# Patient Record
Sex: MALE | HISPANIC OR LATINO | Employment: FULL TIME | URBAN - METROPOLITAN AREA
[De-identification: names, ages, dates, MRNs, and addresses within clinical notes are randomized per-mention and may not be internally consistent; named-entity substitution may affect disease eponyms.]

---

## 2021-05-21 ENCOUNTER — HOSPITAL ENCOUNTER (OUTPATIENT)
Dept: LAB | Facility: MEDICAL CENTER | Age: 51
End: 2021-05-21
Attending: ANESTHESIOLOGY
Payer: COMMERCIAL

## 2021-05-21 LAB
COVID ORDER STATUS COVID19: NORMAL
SARS-COV-2 RNA RESP QL NAA+PROBE: NOTDETECTED
SPECIMEN SOURCE: NORMAL

## 2021-05-21 PROCEDURE — U0003 INFECTIOUS AGENT DETECTION BY NUCLEIC ACID (DNA OR RNA); SEVERE ACUTE RESPIRATORY SYNDROME CORONAVIRUS 2 (SARS-COV-2) (CORONAVIRUS DISEASE [COVID-19]), AMPLIFIED PROBE TECHNIQUE, MAKING USE OF HIGH THROUGHPUT TECHNOLOGIES AS DESCRIBED BY CMS-2020-01-R: HCPCS

## 2021-05-21 PROCEDURE — C9803 HOPD COVID-19 SPEC COLLECT: HCPCS

## 2021-05-21 PROCEDURE — U0005 INFEC AGEN DETEC AMPLI PROBE: HCPCS

## 2021-11-03 PROBLEM — S62.001S SNAC (SCAPHOID NON-UNION ADVANCED COLLAPSE) OF WRIST, RIGHT: Status: ACTIVE | Noted: 2021-11-03

## 2021-11-03 PROBLEM — M19.131 SNAC (SCAPHOID NON-UNION ADVANCED COLLAPSE) OF WRIST, RIGHT: Status: ACTIVE | Noted: 2021-11-03

## 2021-11-17 ENCOUNTER — HOSPITAL ENCOUNTER (OUTPATIENT)
Facility: MEDICAL CENTER | Age: 51
End: 2021-11-17
Attending: ANESTHESIOLOGY
Payer: COMMERCIAL

## 2021-11-17 LAB — COVID ORDER STATUS COVID19: NORMAL

## 2021-11-17 PROCEDURE — U0005 INFEC AGEN DETEC AMPLI PROBE: HCPCS

## 2021-11-17 PROCEDURE — U0003 INFECTIOUS AGENT DETECTION BY NUCLEIC ACID (DNA OR RNA); SEVERE ACUTE RESPIRATORY SYNDROME CORONAVIRUS 2 (SARS-COV-2) (CORONAVIRUS DISEASE [COVID-19]), AMPLIFIED PROBE TECHNIQUE, MAKING USE OF HIGH THROUGHPUT TECHNOLOGIES AS DESCRIBED BY CMS-2020-01-R: HCPCS

## 2021-11-18 LAB
SARS-COV-2 RNA RESP QL NAA+PROBE: NOTDETECTED
SPECIMEN SOURCE: NORMAL

## 2022-04-30 ENCOUNTER — OFFICE VISIT (OUTPATIENT)
Dept: URGENT CARE | Facility: CLINIC | Age: 52
End: 2022-04-30
Payer: COMMERCIAL

## 2022-04-30 VITALS
HEIGHT: 65 IN | DIASTOLIC BLOOD PRESSURE: 74 MMHG | OXYGEN SATURATION: 95 % | BODY MASS INDEX: 38.17 KG/M2 | TEMPERATURE: 96.4 F | RESPIRATION RATE: 16 BRPM | WEIGHT: 229.1 LBS | HEART RATE: 96 BPM | SYSTOLIC BLOOD PRESSURE: 118 MMHG

## 2022-04-30 DIAGNOSIS — M10.9 ACUTE GOUT OF RIGHT FOOT, UNSPECIFIED CAUSE: ICD-10-CM

## 2022-04-30 PROBLEM — R53.83 FATIGUE: Status: ACTIVE | Noted: 2022-03-04

## 2022-04-30 PROBLEM — E78.5 HYPERLIPIDEMIA: Status: ACTIVE | Noted: 2022-03-04

## 2022-04-30 PROBLEM — R07.9 CHEST PAIN: Status: ACTIVE | Noted: 2022-03-04

## 2022-04-30 PROBLEM — F10.11 HISTORY OF ALCOHOL ABUSE: Status: ACTIVE | Noted: 2022-03-04

## 2022-04-30 PROBLEM — R06.83 SNORING: Status: ACTIVE | Noted: 2022-03-04

## 2022-04-30 PROBLEM — E66.01 MORBID OBESITY (HCC): Status: ACTIVE | Noted: 2022-03-04

## 2022-04-30 PROBLEM — I10 ESSENTIAL HYPERTENSION: Status: ACTIVE | Noted: 2022-03-04

## 2022-04-30 PROCEDURE — 99213 OFFICE O/P EST LOW 20 MIN: CPT | Performed by: NURSE PRACTITIONER

## 2022-04-30 RX ORDER — BENZOCAINE/MENTHOL 6 MG-10 MG
1 LOZENGE MUCOUS MEMBRANE 2 TIMES DAILY
COMMUNITY
End: 2022-05-13 | Stop reason: SDUPTHER

## 2022-04-30 RX ORDER — ATORVASTATIN CALCIUM 20 MG/1
20 TABLET, FILM COATED ORAL NIGHTLY
COMMUNITY
End: 2023-05-30 | Stop reason: SDUPTHER

## 2022-04-30 RX ORDER — ATORVASTATIN CALCIUM 40 MG/1
40 TABLET, FILM COATED ORAL DAILY
COMMUNITY
End: 2022-04-30

## 2022-04-30 RX ORDER — LISINOPRIL 40 MG/1
40 TABLET ORAL DAILY
COMMUNITY
End: 2023-05-30 | Stop reason: SDUPTHER

## 2022-04-30 RX ORDER — METHYLPREDNISOLONE 4 MG/1
TABLET ORAL
Qty: 21 TABLET | Refills: 0 | Status: SHIPPED | OUTPATIENT
Start: 2022-04-30 | End: 2022-05-06

## 2022-04-30 ASSESSMENT — ENCOUNTER SYMPTOMS
NUMBNESS: 0
JOINT SWELLING: 1

## 2022-04-30 NOTE — PROGRESS NOTES
Subjective:     Cristian Suarez is a 51 y.o. male who presents for Gout (R Big Toe 3 days, swelling, a lot of pain, hx of gout, 4 months ago, swelling usually comes on ankle or knee, [facility] )      Pain and swelling to right foot x 3 days. Has a history of gout with eating seafood's. Felt feverish yesterday. No URI symptoms. Pain 7/10. States he no longer drinks alcohol, as he used to. Denies recent injury.     services used during duration of visit, #[id number].    Foot Swelling  This is a new problem. The current episode started in the past 7 days. The problem has been gradually worsening. Associated symptoms include joint swelling. Pertinent negatives include no coughing, numbness or sore throat. The symptoms are aggravated by walking. He has tried nothing for the symptoms.       Past Medical History:   Diagnosis Date   • Hyperlipidemia    • Hypertension        Past Surgical History:   Procedure Laterality Date   • PB EXCISE HAND/FOOT NEUROMA Right 11/23/2021    Procedure: LEFT WRIST DENERVATION;  Surgeon: Toby Catalan M.D.;  Location: [facility] [place];  Service: Orthopedics       Social History     Socioeconomic History   • Marital status: Unknown     Spouse name: Not on file   • Number of children: Not on file   • Years of education: Not on file   • Highest education level: Not on file   Occupational History   • Not on file   Tobacco Use   • Smoking status: Never Smoker   • Smokeless tobacco: Never Used   Substance and Sexual Activity   • Alcohol use: Not on file   • Drug use: Not on file   • Sexual activity: Not on file   Other Topics Concern   • Not on file   Social History Narrative    ** Merged History Encounter **          Social Determinants of Health     Financial Resource Strain: Not on file   Food Insecurity: Not on file   Transportation Needs: Not on file   Physical Activity: Not on file   Stress: Not on file   Social Connections: Not on file   Intimate  "Partner Violence: Not on file   Housing Stability: Not on file        History reviewed. No pertinent family history.     No Known Allergies    Review of Systems   HENT: Negative for sore throat.    Respiratory: Negative for cough.    Musculoskeletal: Positive for joint pain and joint swelling.   Neurological: Negative for sensory change and numbness.   All other systems reviewed and are negative.       Objective:   /74 (BP Location: Right arm, Patient Position: Sitting, BP Cuff Size: Adult)   Pulse 96   Temp (!) 35.8 °C (96.4 °F) (Temporal)   Resp 16   Ht 1.651 m (5' 5\")   Wt 104 kg (229 lb 1.6 oz)   SpO2 95%   BMI 38.12 kg/m²     Physical Exam  Vitals reviewed.   Constitutional:       General: He is not in acute distress.     Appearance: He is well-developed.   Cardiovascular:      Rate and Rhythm: Normal rate.      Pulses:           Dorsalis pedis pulses are 2+ on the right side.   Pulmonary:      Effort: Pulmonary effort is normal. No respiratory distress.   Musculoskeletal:         General: Swelling and tenderness present. No deformity or signs of injury. Normal range of motion.   Feet:      Comments: Tenderness with swelling and erythema to right great toe. No heat or indication of cellulitis.   Skin:     General: Skin is warm and dry.      Capillary Refill: Capillary refill takes less than 2 seconds.      Findings: No rash.   Neurological:      Mental Status: He is alert and oriented to person, place, and time.      GCS: GCS eye subscore is 4. GCS verbal subscore is 5. GCS motor subscore is 6.   Psychiatric:         Speech: Speech normal.         Behavior: Behavior normal.         Thought Content: Thought content normal.         Judgment: Judgment normal.         Assessment/Plan:   1. Acute gout of right foot, unspecified cause  - methylPREDNISolone (MEDROL DOSEPAK) 4 MG Tablet Therapy Pack; Follow schedule on package instructions.  Dispense: 21 Tablet; Refill: 0  - Referral to establish with " [facility]CP    -Monitor for possible diet correlation and modify diet. Limit alcohol intake.  -Oral hydration.  -RICE Therapy: Rest, Ice, Compression, Elevation  -Can also take tylenol as directed for pain.     Follow up with PCP. Follow up for persistent or worsening symptoms, fever, chills, signs of infection. Emergently for severe uncontrolled pain, neurovascular compromise (decreased sensation, motion, or circulation).    Discussed follow up with PCP for recurrent gout exacerbations to discuss additional therapies.     Differential diagnosis, natural history, supportive care, and indications for immediate follow-up discussed.

## 2022-04-30 NOTE — PATIENT INSTRUCTIONS
-Monitor for possible diet correlation and modify diet. Limit alcohol intake.  -Oral hydration.  -RICE Therapy: Rest, Ice, Compression, Elevation  -Can also take tylenol as directed for pain.     Follow up with PCP. Follow up for persistent or worsening symptoms, fever, chills, signs of infection. Emergently for severe uncontrolled pain, neurovascular compromise (decreased sensation, motion, or circulation).      Gota  Gout    La gota es felisha afección que causa la hinchazón dolorosa de las articulaciones. La gota es un tipo de inflamación de las articulaciones (artritis). Esta afección es causada por la acumulación de ácido úrico en el cuerpo. El ácido úrico es felisha sustancia química que se forma cuando el cuerpo descompone sustancias llamadas purinas. Las purinas son importantes para la fabricación de las proteínas del cuerpo.  Cuando el cuerpo tiene un exceso de ácido úrico, se pueden formar rich con punta y acumularse en el interior de las articulaciones. [place] provoca dolor e hinchazón. Las crisis de gota pueden ocurrir rápidamente y ser muy dolorosas (gota aguda). Con el tiempo, las crisis pueden afectar más articulaciones y volverse más frecuentes (gota crónica). La gota también puede provocar la acumulación de ácido úrico debajo de la piel y en los riñones.  ¿Cuáles son las causas?  Esta afección es causada por la acumulación de ácido úrico en la srikanth. [place] puede ocurrir debido a lo siguiente:  · Los riñones no eliminan la cantidad suficiente de ácido úrico de la srikanth. Esta es la causa más frecuente.  · El cuerpo produce demasiado ácido úrico. [place] puede ocurrir con algunos tipos de cáncer y tratamientos para el cáncer. También puede ocurrir si el cuerpo está descomponiendo demasiados glóbulos rojos (anemia hemolítica).  · Come demasiados alimentos ricos en purinas. Estos alimentos incluyen vísceras y algunos mariscos. Las bebidas alcohólicas, en especial la cerveza, también son ricas en purinas.  Felisha  crisis de gota puede desencadenarse debido a un traumatismo o estrés.  ¿Qué incrementa el riesgo?  Es más probable que tenga esta afección si:  · Tiene antecedentes familiares de gota.  · Es hombre de mediana edad.  · Es van que fernandes atravesado la menopausia.  · Tiene obesidad.  · Trudy alcohol con frecuencia, en especial, cerveza.  · Está deshidratado.  · Pierde peso con demasiada rapidez.  · Le nobles hecho un trasplante de un órgano.  · Se ha intoxicado con plomo.  · Usa determinados medicamentos, flaco aspirina, ciclosporina, diuréticos, levodopa y niacina.  · Tiene enfermedades renales.  · Tiene annabelle afección de la piel llamada psoriasis.  ¿Cuáles son los signos o los síntomas?  Un ataque de gota aguda sucede de repente. Normalmente ocurre solo en annabelle articulación. El lugar más común es el pulgar del pie. Las crisis a menudo comienzan por la noche. También pueden verse afectadas las articulaciones de los pies, los tobillos, las rodillas, los dedos de la mano, las muñecas o los codos. Los síntomas de esta afección pueden incluir los siguientes:  · Dolor intenso.  · Calor.  · Hinchazón.  · Entumecimiento.  · Dolor a la palpación. Se puede sentir mucho dolor al tacto en la articulación afectada.  · Piel brillosa, sushma o morada.  · Fiebre y escalofríos.  La gota crónica puede provocar síntomas con más frecuencia. Pueden verse involucradas más articulaciones. Es posible que también tenga bultos blancos o amarillos (tofos) en las trisha o los pies, o en otras zonas cercanas a las articulaciones.  ¿Cómo se diagnostica?  Esta afección se diagnostica en función de los síntomas, los antecedentes médicos y un examen físico. Pueden hacerle estudios, por ejemplo:  · Análisis de srikanth para medir los niveles de ácido úrico.  · Extracción de líquido sinovial con annabelle aguja delgada (aspiración) para buscar la presencia de rich de ácido úrico.  · Radiografías para observar la articulación dañada.  ¿Cómo se trata?  El tratamiento  para esta afección tiene dos fases: tratar un ataque erlin y prevenir ataques futuros. El tratamiento de la gota aguda puede incluir medicamentos para reducir el dolor y la hinchazón, por ejemplo:  · Antiinflamatorios no esteroideos (OLIVER).  · Corticoesteroides. Estos son medicamentos antiinflamatorios laith que se pueden rj por vía oral por boca o se pueden inyectar en annabelle articulación.  · Colchicina. Cecy medicamento shelia el dolor y la hinchazón cuando se usa inmediatamente después de annabelle crisis. Puede administrarse por la boca o por annabelle vía intravenosa.  El tratamiento preventivo puede incluir lo siguiente:  · El uso diario de dosis más bajas de OLIVER o colchicina.  · El uso de un medicamento que reduce los niveles de ácido úrico en la srikanth.  · Cambios en la dieta. Es posible que deba consultar a un nutricionista acerca de lo que debe comer y beber para evitar la gota.  Siga estas indicaciones en cervantes casa:  Susu annabelle crisis de gota    · Si se lo indican, aplique hielo sobre la nicci afectada:  ? Ponga el hielo en annabelle bolsa plástica.  ? Coloque annabelle toalla entre la piel y la bolsa.  ? Coloque el hielo susu 20 minutos, 2 a 3 veces por día.  · Levante (eleve) la articulación afectada por encima del nivel del corazón tantas veces flaco le sea posible.  · Rosy reposo todo el tiempo que pueda. Si la articulación afectada se encuentra en la pierna, quizá deba usar muletas.  · Siga las indicaciones del médico respecto de las restricciones en las comidas o las bebidas.  Cómo evitar las crisis de gota en el futuro  · Siga annabelle dieta baja en purinas flaco se lo haya indicado el nutricionista o el médico. Evite alimentos y bebidas con alto contenido de purinas, por ejemplo, hígado, riñón, anchoas, espárragos, arenque, hongos, mejillones y cerveza.  · Mantenga un peso saludable o pierda peso si tiene sobrepeso. Si quiere perder peso, hable con el médico. Es importante que no pierda peso demasiado rápido.  · Comience o  siga un programa de actividad física flaco se lo haya indicado el médico.  Comida y bebida  · Padmini suficiente líquido para mantener la orina de color amarillo pálido.  · Si omar alcohol:  ? Limite la cantidad que omar a lo siguiente:  § De 0 a 1 medida por día para las mujeres.  § De 0 a 2 medidas por día para los hombres.  ? Esté atento a la cantidad de alcohol que hay en las bebidas que nick. En los [place], annabelle medida equivale a annabelle botella de cerveza de 12 oz (355 ml), un vaso de vino de 5 oz (148 ml) o un vaso de annabelle bebida alcohólica de zion graduación de 1½ oz (44 ml).  Indicaciones generales  · [place] los medicamentos de venta sumi y los recetados solamente flaco se lo haya indicado el médico.  · No conduzca ni use maquinaria pesada mientras nick analgésicos recetados.  · Retome froy actividades normales según lo indicado por el médico. Pregúntele al médico qué actividades son seguras para usted.  · Concurra a todas las visitas de control flaco se lo haya indicado el médico. [place] es importante.  Comuníquese con un médico si tiene:  · Otra crisis de gota.  · Síntomas de annabelle crisis de gota que continúan después de 10 días de tratamiento.  · Efectos secundarios provocados por los medicamentos.  · Escalofríos o fiebre.  · Dolor urente al orinar.  · Dolor en la parte inferior de la espalda o en el vientre.  Solicite ayuda inmediatamente si:  · Siente dolor intenso o incontrolable.  · No puede orinar.  Resumen  · La gota es annabelle hinchazón dolorosa de las articulaciones causada por annabelle inflamación.  · El lugar más habitual donde se presenta el dolor es el dedo jerome del pie, zandra también pueden verse afectadas otras articulaciones del cuerpo.  · Los medicamentos y los cambios en la dieta pueden ayudar a evitar y tratar las crisis de gota.  Esta información no tiene flaco fin reemplazar el consejo del médico. Asegúrese de hacerle al médico cualquier pregunta que tenga.  Document Released: 09/27/2006 Document  Revised: 08/21/2019 Document Reviewed: 08/21/2019  Elsevier Patient Education © 2020 Elsevier Inc.

## 2022-05-02 ASSESSMENT — ENCOUNTER SYMPTOMS
SENSORY CHANGE: 0
SORE THROAT: 0
COUGH: 0

## 2022-05-06 ENCOUNTER — OFFICE VISIT (OUTPATIENT)
Dept: MEDICAL GROUP | Facility: PHYSICIAN GROUP | Age: 52
End: 2022-05-06
Payer: COMMERCIAL

## 2022-05-06 ENCOUNTER — TELEPHONE (OUTPATIENT)
Dept: SCHEDULING | Facility: IMAGING CENTER | Age: 52
End: 2022-05-06

## 2022-05-06 VITALS
BODY MASS INDEX: 37.15 KG/M2 | OXYGEN SATURATION: 97 % | SYSTOLIC BLOOD PRESSURE: 132 MMHG | HEIGHT: 65 IN | HEART RATE: 86 BPM | DIASTOLIC BLOOD PRESSURE: 78 MMHG | TEMPERATURE: 96.6 F | WEIGHT: 223 LBS | RESPIRATION RATE: 20 BRPM

## 2022-05-06 DIAGNOSIS — Z00.00 PHYSICAL EXAM, ANNUAL: ICD-10-CM

## 2022-05-06 DIAGNOSIS — F10.11 HISTORY OF ALCOHOL ABUSE: ICD-10-CM

## 2022-05-06 DIAGNOSIS — K57.92 DIVERTICULITIS: ICD-10-CM

## 2022-05-06 DIAGNOSIS — R06.83 SNORING: ICD-10-CM

## 2022-05-06 DIAGNOSIS — K21.9 GASTROESOPHAGEAL REFLUX DISEASE WITHOUT ESOPHAGITIS: ICD-10-CM

## 2022-05-06 DIAGNOSIS — R53.83 FATIGUE, UNSPECIFIED TYPE: ICD-10-CM

## 2022-05-06 DIAGNOSIS — F19.11 HISTORY OF DRUG ABUSE (HCC): ICD-10-CM

## 2022-05-06 DIAGNOSIS — R06.02 SOB (SHORTNESS OF BREATH): ICD-10-CM

## 2022-05-06 DIAGNOSIS — M10.9 ACUTE GOUT INVOLVING TOE OF RIGHT FOOT, UNSPECIFIED CAUSE: ICD-10-CM

## 2022-05-06 DIAGNOSIS — R10.12 LEFT UPPER QUADRANT PAIN: ICD-10-CM

## 2022-05-06 DIAGNOSIS — E78.2 MIXED HYPERLIPIDEMIA: ICD-10-CM

## 2022-05-06 DIAGNOSIS — I10 ESSENTIAL HYPERTENSION: ICD-10-CM

## 2022-05-06 PROCEDURE — 99214 OFFICE O/P EST MOD 30 MIN: CPT | Performed by: NURSE PRACTITIONER

## 2022-05-06 RX ORDER — PREDNISONE 20 MG/1
20 TABLET ORAL 2 TIMES DAILY
Qty: 30 TABLET | Refills: 0 | Status: SHIPPED | OUTPATIENT
Start: 2022-05-06 | End: 2022-06-01

## 2022-05-06 RX ORDER — PREDNISONE 20 MG/1
20 TABLET ORAL DAILY
Qty: 30 TABLET | Refills: 0 | Status: SHIPPED
Start: 2022-05-06 | End: 2022-05-06

## 2022-05-06 RX ORDER — OMEPRAZOLE 20 MG/1
20 CAPSULE, DELAYED RELEASE ORAL DAILY
Qty: 30 CAPSULE | Refills: 0 | Status: SHIPPED | OUTPATIENT
Start: 2022-05-06 | End: 2023-05-30 | Stop reason: SDUPTHER

## 2022-05-06 RX ORDER — NAPROXEN 500 MG/1
500 TABLET ORAL 2 TIMES DAILY WITH MEALS
Qty: 60 TABLET | Refills: 0 | Status: SHIPPED
Start: 2022-05-06 | End: 2022-07-26

## 2022-05-06 ASSESSMENT — PATIENT HEALTH QUESTIONNAIRE - PHQ9: CLINICAL INTERPRETATION OF PHQ2 SCORE: 0

## 2022-05-06 NOTE — ASSESSMENT & PLAN NOTE
History of drug abuse with cocaine and crystal meth however the patient has been sober for the last 10 years

## 2022-05-06 NOTE — ASSESSMENT & PLAN NOTE
Currently taking lisinopril 40 mg daily.  Blood pressure in the office 132/78.  Blood pressure controlled on lisinopril.  Patient denies headaches, change in vision, lightheadedness, chest pain.

## 2022-05-06 NOTE — ASSESSMENT & PLAN NOTE
Patient has a history of hyperlipidemia.  He currently takes atorvastatin 20 mg every night.  We will get new lipid panel as well as reassess his liver enzymes.  He denies any myalgias with this medication

## 2022-05-06 NOTE — PATIENT INSTRUCTIONS
Diverticulitis  Diverticulitis    La diverticulitis ocurre cuando pequeños bolsillos que se nobles formado en el intestino grueso (colon) se infectan o se inflaman. [place] produce dolor de estómago y heces líquidas (diarrea).  Estas bolsas en el colon se denominan divertículos. Se pili en las personas que tienen annabelle afección llamada diverticulitis.  Siga estas indicaciones en cervantes casa:  Medicamentos  · [place] los medicamentos de venta sumi y los recetados solamente flaco se lo haya indicado el médico. Estos incluyen los siguientes:  ? Antibióticos.  ? Analgésicos.  ? Pastillas de fibra.  ? Probióticos.  ? Laxantes.  · No conduzca ni use maquinaria pesada mientras nick analgésicos recetados.  · Si le recetaron un antibiótico, tómelo flaco se lo hayan indicado. No deje de tomarlos aunque se sienta mejor.  Instrucciones generales    · Siga la dieta flaco se lo haya indicado el médico.  · Cuando se sienta mejor, el médico puede indicarle que cambie la dieta. [place] vez necesite ingerir gran cantidad de fibra. La fibra facilita la evacuación intestinal (defecación). Entre los alimentos saludables con fibra, se incluyen los siguientes:  ? [place] rojos.  ? Frijoles.  ? Lentejas.  ? Verduras de hoja johnathon.  · Rosy ejercicios 3 o más veces por semana. Hágalos susu 30 minutos cada vez. Ejercítese lo suficiente flaco para transpirar y acelerar los latidos cardíacos.  · Concurra a todas las visitas de control flaco se lo hayan indicado. [place] es importante. Puede que tenga que someterse a un examen del intestino grueso. [place] se denomina colonoscopia.  Comuníquese con un médico si:  · El dolor no mejora.  · Le fernando mucho comer o beber.  · No defeca flaco lo hace normalmente.  Solicite ayuda de inmediato si:  · El dolor empeora.  · Los problemas no mejoran.  · Los problemas empeoran muy rápidamente.  · Tiene fiebre.  · Devuelve (vomita) más de annabelle vez.  · Sruthi heces tienen las siguientes características:  ? Contienen srikanth.  ? Son de color  rubén.  ? Son alquitranadas.  Resumen  · La diverticulitis ocurre cuando pequeños bolsillos que se nobles formado en el intestino grueso (colon) se infectan o se inflaman.  · [place] los medicamentos solamente flaco se lo haya indicado el médico.  · Siga la dieta flaco se lo haya indicado el médico.  Esta información no tiene flaco fin reemplazar el consejo del médico. Asegúrese de hacerle al médico cualquier pregunta que tenga.  Document Released: 12/06/2012 Document Revised: 06/21/2018 Document Reviewed: 06/21/2018  Elsevier Patient Education © 2020 Elsevier Inc.

## 2022-05-06 NOTE — ASSESSMENT & PLAN NOTE
Was recently seen at the urgent care for a gout flare on his right big toe.  At that time he was prescribed a steroid Dosepak.  He states that he has finished this and noticed that after the first few hours of taking this medication there was improvement to his toe and the pain however since he stopped it seems as if the gout attack has come back.  We will go ahead and start him on 20 mg twice daily prednisone and see if this helps with his flare.  Patient will return back to the office in 1 week.  Was also discussed with patient to start naproxen 500 mg twice daily and to continue this.

## 2022-05-07 NOTE — ASSESSMENT & PLAN NOTE
Patient and wife state that they believe he does have a history of diverticulitis  States occasional left upper quadrant abdominal pain denies chills, fevers, nausea, vomiting.

## 2022-05-07 NOTE — ASSESSMENT & PLAN NOTE
Patient complains of occasional left upper abdominal quadrant pain.  He states that sometimes it feels tight and tender when he pushes on it.  Him and his wife have noted that over the last 10 years his abdomen has enlarged.  He did have a history of alcohol abuse as well as drug abuse however he has stopped doing that more than 5 years ago.  He denies any blood in his stool, nausea vomiting, chills or fevers

## 2022-05-07 NOTE — ASSESSMENT & PLAN NOTE
BMI 37.11, weight 223  Per wife patient states that in the last 10 years she has noticed his belly become more full and distended

## 2022-05-07 NOTE — PROGRESS NOTES
Chief Complaint   Patient presents with   • [facility]     Gout x 1 yr, comes and goes R foot       Subjective:     Cristian Suarez is a 51 y.o. male presenting to establish care    1. Essential hypertension  New to me  Chronic and stable condition  Currently takes lisinopril 40 mg daily  Denies headaches, lightheadedness, change in vision    2. Fatigue, unspecified type  New to me  Chronic and stable condition    3. Mixed hyperlipidemia  New to me  Chronic stable condition  Patient currently takes atorvastatin 20 mg daily    4. Physical exam, annual  Patient needs new labs    5. SOB (shortness of breath)  New to me  Acute and uncomplicated condition  He is wondering if this is related to his GERD  He also notices when he bends down as he does have a larger belly  He is able to be active during the day without any adverse effects    6. Acute gout involving toe of right foot, unspecified cause  New to me  Acute uncomplicated condition  Patient was recently seen in the urgent care on 4/30/2022  He was provided with a Medrol Dosepak and told to return for worsening symptoms    7. History of alcohol abuse  New to me  Chronic and stable condition  Has been sober for 6 years    8. Snoring  New to me  Chronic and stable condition  Wife denies any episodes of apnea    9. History of drug abuse (HCC)  New to me  Chronic and stable condition  Patient has been clean for 10 years    10. Left upper quadrant pain  New to me  Undiagnosed new condition with uncertain prognosis  Patient states he has been having a little more abdominal upper quadrant tenderness  Denies any blood in his stool, blood in his urine, nausea vomiting  Patient and wife state that he may have a history of diverticulitis but they are unsure    11. Diverticulitis  New to me  Patient states that he may have a history of diverticulitis but they are unsure  Complains of left upper quadrant abdominal pain  Denies nausea vomiting, chills, fevers, blood  "in his stool    12. Gastroesophageal reflux disease without esophagitis  New to me  Chronic and stable condition  Patient requesting refills on omeprazole  Is wondering if his shortness of breath and his abdominal pain is related to his reflux    13. BMI 37.0-37.9, adult  New to me  Chronic and exacerbated condition  BMI 37.1, weight 223       ROS   See HPI      Current Outpatient Medications:   •  predniSONE (DELTASONE) 20 MG Tab, Take 1 Tablet by mouth 2 times a day., Disp: 30 Tablet, Rfl: 0  •  naproxen (NAPROSYN) 500 MG Tab, Take 1 Tablet by mouth 2 times a day with meals., Disp: 60 Tablet, Rfl: 0  •  omeprazole (PRILOSEC) 20 MG delayed-release capsule, Take 1 Capsule by mouth every day., Disp: 30 Capsule, Rfl: 0  •  lisinopril (PRINIVIL) 40 MG tablet, Take 40 mg by mouth every day., Disp: , Rfl:   •  atorvastatin (LIPITOR) 20 MG Tab, Take 20 mg by mouth every evening., Disp: , Rfl:   •  hydrocortisone 1 % Cream, Apply 1 Application topically 2 times a day., Disp: , Rfl:   •  ibuprofen (MOTRIN) 600 MG Tab, Take 600 mg by mouth every 6 hours as needed., Disp: , Rfl:     Past Medical History:   Diagnosis Date   • Hyperlipidemia    • Hypertension        Past Surgical History:   Procedure Laterality Date   • PB EXCISE HAND/FOOT NEUROMA Right 11/23/2021    Procedure: LEFT WRIST DENERVATION;  Surgeon: Toby Catalan M.D.;  Location: [facility] [place];  Service: Orthopedics       History reviewed. No pertinent family history.    Patient has no known allergies.    Allergies, past medical history, past surgical history, family history, social history reviewed and updated    Objective:     Vitals: /78   Pulse 86   Temp 35.9 °C (96.6 °F) (Temporal)   Resp 20   Ht 1.651 m (5' 5\")   Wt 101 kg (223 lb)   SpO2 97%   BMI 37.11 kg/m²   General: Alert, pleasant, NAD  EYES:   PERRL, EOMI, no icterus or pallor.  Conjunctivae and lids normal.   HENT:  Normocephalic.  External ears normal.   Heart: Regular " rate and rhythm.  S1 and S2 normal.  No murmurs appreciated.  Respiratory: Normal respiratory effort.  Clear to auscultation bilaterally.  Abdomen: Distended, soft, non-tender, no guarding/rebound. Bowel sounds present.    No hernias.  Skin: Warm, dry, no rashes.  Musculoskeletal: Gait is normal.  Moves all extremities well.    Extremities: No clubbing, cyanosis or edema noted.   Neurological: No tremors, sensation grossly intact, tone/strength normal, gait is normal, CN2-12 intact.  Psych:  Affect/mood is normal, judgement is good, memory is intact, grooming is appropriate.    Assessment/Plan:     Essential hypertension  Currently taking lisinopril 40 mg daily.  Blood pressure in the office 132/78.  Blood pressure controlled on lisinopril.  Patient denies headaches, change in vision, lightheadedness, chest pain.    Gout  Was recently seen at the urgent care for a gout flare on his right big toe.  At that time he was prescribed a steroid Dosepak.  He states that he has finished this and noticed that after the first few hours of taking this medication there was improvement to his toe and the pain however since he stopped it seems as if the gout attack has come back.  We will go ahead and start him on 20 mg twice daily prednisone and see if this helps with his flare.  Patient will return back to the office in 1 week.  Was also discussed with patient to start naproxen 500 mg twice daily and to continue this.        History of alcohol abuse  History of alcohol abuse however patient has been sober for the last 6 years     Hyperlipidemia  Patient has a history of hyperlipidemia.  He currently takes atorvastatin 20 mg every night.  We will get new lipid panel as well as reassess his liver enzymes.  He denies any myalgias with this medication     Snoring  Wife states he snores at night but denies apneic episdoes    History of drug abuse (HCC)  History of drug abuse with cocaine and crystal meth however the patient has been  sober for the last 10 years     BMI 37.0-37.9, adult  BMI 37.11, weight 223  Per wife patient states that in the last 10 years she has noticed his belly become more full and distended    Diverticulitis  Patient and wife state that they believe he does have a history of diverticulitis  States occasional left upper quadrant abdominal pain denies chills, fevers, nausea, vomiting.    Left upper quadrant pain  Patient complains of occasional left upper abdominal quadrant pain.  He states that sometimes it feels tight and tender when he pushes on it.  Him and his wife have noted that over the last 10 years his abdomen has enlarged.  He did have a history of alcohol abuse as well as drug abuse however he has stopped doing that more than 5 years ago.  He denies any blood in his stool, nausea vomiting, chills or fevers       Discussed with patient possible alternative diagnoses, patient is to take all medications as prescribed.     If symptoms persist FU w/PCP, if symptoms worsen go to emergency room.     If experiencing any side effects from prescribed medications reports to the office immediately or go to emergency room.    Reviewed indication, dosage, usage and potential adverse effects of prescribed medications.     Reviewed risks and benefits of treatment plan. Patient verbalizes understanding of all instruction and verbally agrees to plan.    Discussed plan with the patient, and she agrees to the above.      I personally reviewed prior external notes and test results pertinent to today's visit.        Return in about 1 week (around 5/13/2022).      Please note that this dictation was created using voice recognition software. I have made every reasonable attempt to correct obvious errors, but I expect that there may be errors of grammar and possibly content that I did not discover before finalizing the note.

## 2022-05-13 ENCOUNTER — OFFICE VISIT (OUTPATIENT)
Dept: MEDICAL GROUP | Facility: PHYSICIAN GROUP | Age: 52
End: 2022-05-13
Payer: COMMERCIAL

## 2022-05-13 VITALS
HEIGHT: 65 IN | BODY MASS INDEX: 38.05 KG/M2 | SYSTOLIC BLOOD PRESSURE: 156 MMHG | DIASTOLIC BLOOD PRESSURE: 98 MMHG | RESPIRATION RATE: 16 BRPM | WEIGHT: 228.4 LBS | TEMPERATURE: 97.4 F | HEART RATE: 86 BPM | OXYGEN SATURATION: 98 %

## 2022-05-13 DIAGNOSIS — L20.9 ATOPIC DERMATITIS, UNSPECIFIED TYPE: ICD-10-CM

## 2022-05-13 DIAGNOSIS — R10.12 LEFT UPPER QUADRANT PAIN: ICD-10-CM

## 2022-05-13 DIAGNOSIS — M10.9 ACUTE GOUT INVOLVING TOE OF RIGHT FOOT, UNSPECIFIED CAUSE: ICD-10-CM

## 2022-05-13 DIAGNOSIS — I10 ESSENTIAL HYPERTENSION: ICD-10-CM

## 2022-05-13 DIAGNOSIS — R05.8 DRY COUGH: ICD-10-CM

## 2022-05-13 PROCEDURE — 99214 OFFICE O/P EST MOD 30 MIN: CPT | Performed by: NURSE PRACTITIONER

## 2022-05-13 RX ORDER — COLCHICINE 0.6 MG/1
TABLET ORAL
Qty: 30 TABLET | Refills: 0 | Status: SHIPPED | OUTPATIENT
Start: 2022-05-13 | End: 2022-06-01 | Stop reason: SDUPTHER

## 2022-05-13 RX ORDER — BENZOCAINE/MENTHOL 6 MG-10 MG
1 LOZENGE MUCOUS MEMBRANE 2 TIMES DAILY
Qty: 20 G | Refills: 1 | Status: SHIPPED | OUTPATIENT
Start: 2022-05-13 | End: 2023-09-06 | Stop reason: SDUPTHER

## 2022-05-13 NOTE — ASSESSMENT & PLAN NOTE
/98  Taking 40 mg lisinopril daily   Has a dry cough, eye irritation sometimes a headache  Denies CP, SOB  Provided patient with blood pressure journal, instructions on how to use blood pressure journal, instructions and education on blood pressure monitoring system

## 2022-05-13 NOTE — PATIENT INSTRUCTIONS
1.  stop taking the prednisone    2.  Start taking blood pressure journal 2 times a day    3.   colchicine at the pharmacy begin taking this at the first sign of gout flare    4.   over-the-counter allergy medication such as Zyrtec, Allegra, Claritin take this daily for 2 weeks until you see me    5. Get blood work done before you see me next      Colchicine:   For gout  First sign of a flare you need to take 1.2 mg followed by 0.6 mg after 1 hour initiate as soon as possible ideally within the first 12 to 24 hours of the flare.  On day 2 and any day afterwards you will take 0.6 mg once or twice daily up to 2 days after the flare resolves    Checking Blood Pressure:  -Blood pressure cuff, spend in the $40-65, with good return policy  -It should be automatic, upper arm, measure your arm to get the correct size, probably adult Large but your arm should be under 16.5 cm. If you need an XL cuff, you will have to have it special ordered from a pharmacy or durable medical equipment company.  -Put the cuff in place, rest arm on table near height of your heart, sit quietly for 5 min, legs uncrossed, with back support, then take your blood pressure, write it down, keep a log  -Check no more than 1 time day, maybe 4-5 times per week, try different times of day.  -Can bring your cuff to at least one appointment where it can be calibrated to a manual cuff if you are concerned.  -Goal blood pressure is at least under 140/90       Salt=sodium=sea salt, guidelines say stay under 2,500 mg daily     Get salt smart, start looking at labels, count it up.  Salt is hidden in everything, salad dressing, sauces, cheese, most canned food, any processed meat

## 2022-05-14 NOTE — PROGRESS NOTES
CC:  Chief Complaint   Patient presents with   • Follow-Up   • Medication Refill     hydrocortisone 1 % Cream        • Cough     Dry cough       HISTORY OF PRESENT ILLNESS: Patient is a 51 y.o. male established patient presenting follow-up foot pain, medication refill, cough    1. Acute gout involving toe of right foot, unspecified cause  Acute and uncomplicated condition  Resolved  Patient will stop taking prednisone twice daily  Discussed diet change, seafood, red meats    2. Essential hypertension  Chronic exacerbated condition  Currently taking lisinopril 40 mg daily  Blood pressure in office 150/98, 156/98  States occasional headache  Denies lightheadedness, change in vision, chest pain, shortness of breath    3. Atopic dermatitis, unspecified type  New to me  Chronic and stable condition  Patient has a history of allergies to polyester  Is requesting refill on hydrocortisone cream in case he gets an outbreak  No current skin condition at this time    4. Dry cough  Acute uncomplicated condition  States recent cough in the last week  Patient does take lisinopril 40 mg for blood pressure  Denies sputum production, chest pain, shortness of breath    5. BMI 38.0-38.9,adult  Chronic and exacerbated condition  5 pound weight gain since last visit  BMI 38.01  Weight 228    6. Left upper quadrant pain  Chronic and stable condition  Continue to be pending labs as he has not completed these yet  Denies any change in bowels, blood in stool, nausea vomiting         Allergies:Patient has no known allergies.    Current Outpatient Medications   Medication Sig Dispense Refill   • colchicine (COLCRYS) 0.6 MG Tab Day 1: First sign of a flare you need to take 1.2 mg followed by 0.6 mg after 1 hour initiate as soon as possible ideally within the first 12 to 24 hours of the flare.  Day 2 and any day afterwards you will take 0.6 mg once or twice daily up to 2 days after the flare resolves 30 Tablet 0   • hydrocortisone 1 % Cream  "Apply 1 Application topically 2 times a day. Apply to underarms during atopic dermatitis reaction 20 g 1   • predniSONE (DELTASONE) 20 MG Tab Take 1 Tablet by mouth 2 times a day. 30 Tablet 0   • naproxen (NAPROSYN) 500 MG Tab Take 1 Tablet by mouth 2 times a day with meals. 60 Tablet 0   • omeprazole (PRILOSEC) 20 MG delayed-release capsule Take 1 Capsule by mouth every day. 30 Capsule 0   • lisinopril (PRINIVIL) 40 MG tablet Take 40 mg by mouth every day.     • atorvastatin (LIPITOR) 20 MG Tab Take 20 mg by mouth every evening.     • ibuprofen (MOTRIN) 600 MG Tab Take 600 mg by mouth every 6 hours as needed.       No current facility-administered medications for this visit.       Social History     Tobacco Use   • Smoking status: Never Smoker   • Smokeless tobacco: Never Used   Vaping Use   • Vaping Use: Never used   Substance Use Topics   • Alcohol use: Never   • Drug use: Never     Social History     Social History Narrative    ** Merged History Encounter **            History reviewed. No pertinent family history.     ROS   See HPI    - NOTE: All other systems reviewed and are negative, except as in HPI.      Exam:    BP (!) 156/98 (BP Location: Left arm, Patient Position: Sitting, BP Cuff Size: Adult)   Pulse 86   Temp 36.3 °C (97.4 °F) (Temporal)   Resp 16   Ht 1.651 m (5' 5\")   Wt 104 kg (228 lb 6.4 oz)   SpO2 98%  Body mass index is 38.01 kg/m².    General: Alert, pleasant, NAD  EYES:   PERRL, EOMI, no icterus or pallor.  Conjunctivae and lids normal.   HENT:  Normocephalic.  External ears normal.   Heart: Regular rate and rhythm.  S1 and S2 normal.  No murmurs appreciated.  Respiratory: Normal respiratory effort.  Clear to auscultation bilaterally.  Abdomen: Distended, soft, non-tender, no guarding/rebound. Bowel sounds present.  No hepatosplenomegaly.  No hernias.  Skin: Warm, dry, no rashes.  Musculoskeletal: Gait is normal.  Moves all extremities well.    Extremities: No clubbing, cyanosis or " edema noted.   Neurological: No tremors, sensation grossly intact, tone/strength normal, gait is normal, CN2-12 intact.  Psych:  Affect/mood is normal, judgement is good, memory is intact, grooming is appropriate.    Assessment/Plan:  Essential hypertension  /98  Taking 40 mg lisinopril daily   Has a dry cough, eye irritation sometimes a headache  Denies CP, SOB  Provided patient with blood pressure journal, instructions on how to use blood pressure journal, instructions and education on blood pressure monitoring system    Gout  Patient was taking prednisone 20 mg twice daily.  He states that he did notice much improvement and is no longer having swelling or pain to his right great toe.    He will stop the prednisone as he no longer has pain   We discussed starting him on medication that he be taken daily for gout prophylaxis however he would like to try diet change first   Will prescribe Colchicine 0.6mg with instructions if this occurs again we will start pt on daily meds  Will order uric acid as well    Dry cough  Onset 1 week ago   Currently taking ACE I for last 3-4 years   Not sure if it is allergies or lisinopril   We will try a OTC allergy medication for 2 week and if there is no change in cough we will change his BP meds    BMI 38.0-38.9,adult  BMI 38.01  Wt 223  Noted 5 pound weight gain since 5/6/2022  Patient states that over the last week he has improved his diet    Chest pain  States improvement with omeprazole    Left upper quadrant pain  Patient states that he did have another complaint of some left upper quadrant pain and has noticed more gassiness.  Upon palpation no guarding or rebounding patient denies pain.  We are still pending labs     1. Acute gout involving toe of right foot, unspecified cause  URIC ACID    colchicine (COLCRYS) 0.6 MG Tab   2. Essential hypertension     3. Atopic dermatitis, unspecified type  hydrocortisone 1 % Cream   4. Dry cough     5. BMI 38.0-38.9,adult     6.  Left upper quadrant pain       Discussed with patient possible alternative diagnoses, patient is to take all medications as prescribed.     If symptoms persist FU w/PCP, if symptoms worsen go to emergency room.     If experiencing any side effects from prescribed medications reports to the office immediately or go to emergency room.    Reviewed indication, dosage, usage and potential adverse effects of prescribed medications.     Reviewed risks and benefits of treatment plan. Patient verbalizes understanding of all instruction and verbally agrees to plan.      Return in about 2 weeks (around 5/27/2022) for follow up bp, cough, labs.     Please note that this dictation was created using voice recognition software. I have made every reasonable attempt to correct obvious errors, but I expect that there are errors of grammar and possibly content that I did not discover before finalizing the note.

## 2022-05-14 NOTE — ASSESSMENT & PLAN NOTE
BMI 38.01  Wt 223  Noted 5 pound weight gain since 5/6/2022  Patient states that over the last week he has improved his diet

## 2022-05-14 NOTE — ASSESSMENT & PLAN NOTE
Patient was taking prednisone 20 mg twice daily.  He states that he did notice much improvement and is no longer having swelling or pain to his right great toe.    He will stop the prednisone as he no longer has pain   We discussed starting him on medication that he be taken daily for gout prophylaxis however he would like to try diet change first   Will prescribe Colchicine 0.6mg with instructions if this occurs again we will start pt on daily meds  Will order uric acid as well

## 2022-05-14 NOTE — ASSESSMENT & PLAN NOTE
Patient states that he did have another complaint of some left upper quadrant pain and has noticed more gassiness.  Upon palpation no guarding or rebounding patient denies pain.  We are still pending labs

## 2022-05-14 NOTE — ASSESSMENT & PLAN NOTE
Onset 1 week ago   Currently taking ACE I for last 3-4 years   Not sure if it is allergies or lisinopril   We will try a OTC allergy medication for 2 week and if there is no change in cough we will change his BP meds

## 2022-06-01 ENCOUNTER — OFFICE VISIT (OUTPATIENT)
Dept: MEDICAL GROUP | Facility: PHYSICIAN GROUP | Age: 52
End: 2022-06-01
Payer: COMMERCIAL

## 2022-06-01 VITALS
TEMPERATURE: 97.9 F | BODY MASS INDEX: 37.12 KG/M2 | OXYGEN SATURATION: 99 % | RESPIRATION RATE: 20 BRPM | HEIGHT: 65 IN | DIASTOLIC BLOOD PRESSURE: 90 MMHG | WEIGHT: 222.8 LBS | HEART RATE: 89 BPM | SYSTOLIC BLOOD PRESSURE: 140 MMHG

## 2022-06-01 DIAGNOSIS — I10 ESSENTIAL HYPERTENSION: ICD-10-CM

## 2022-06-01 DIAGNOSIS — M10.9 ACUTE GOUT INVOLVING TOE OF RIGHT FOOT, UNSPECIFIED CAUSE: ICD-10-CM

## 2022-06-01 DIAGNOSIS — R05.8 DRY COUGH: ICD-10-CM

## 2022-06-01 PROBLEM — R07.9 CHEST PAIN: Status: RESOLVED | Noted: 2022-03-04 | Resolved: 2022-06-01

## 2022-06-01 PROCEDURE — 99214 OFFICE O/P EST MOD 30 MIN: CPT | Performed by: NURSE PRACTITIONER

## 2022-06-01 RX ORDER — COLCHICINE 0.6 MG/1
TABLET ORAL
Qty: 60 TABLET | Refills: 0 | Status: SHIPPED | OUTPATIENT
Start: 2022-06-01 | End: 2023-05-30 | Stop reason: SDUPTHER

## 2022-06-01 NOTE — ASSESSMENT & PLAN NOTE
Blood pressure 140/90.  Continues to take lisinopril 40 mg daily  Denies headaches, lightheadedness, shortness of breath, chest pain

## 2022-06-01 NOTE — ASSESSMENT & PLAN NOTE
Patient noted improvement to his right great toe however over the weekend while he was working and traveling he began to notice right ankle pain and swelling.  He then started taking his colchicine as prescribed and noted within 2 days that the pain had improved.  He does note that he gets occasional pain while he is ambulating but has improved.   His uric acid was 10.4 on labs for 5/20/2022   he denies any trauma  We will start him on colchicine daily even after he has completed this gout flare and have them do a uric acid test again in 1 month

## 2022-06-01 NOTE — PROGRESS NOTES
CC:  Chief Complaint   Patient presents with   • Follow-Up     Ankle, Right, swollen   • Lab Results       HISTORY OF PRESENT ILLNESS: Patient is a 51 y.o. male established patient presenting for right ankle pain, As well as follow-up lab results    1. Acute gout involving toe of right foot, unspecified cause  Acute uncomplicated condition  Patient did note pain improvement in her right great toe however states that he was working this weekend and noticed on Saturday that he started to have pain in that ankle.  He denies any trauma to this ankle he started taking his colchicine as directed and noticed improvement.    2. Essential hypertension  Chronic and stable condition  Blood pressure in office 140/90  Patient takes lisinopril 40 mg daily  Denies chest pain, lightheadedness, shortness of breath, change with vision    3. Dry cough  Improved         Allergies:Patient has no known allergies.    Current Outpatient Medications   Medication Sig Dispense Refill   • colchicine (COLCRYS) 0.6 MG Tab 0.6 mg daily. For gout flare Day 1: First sign of a flare you need to take 1.2 mg followed by 0.6 mg after 1 hour initiate as soon as possible ideally within the first 12 to 24 hours of the flare.  Day 2 and any day afterwards you will take 0.6 mg once 60 Tablet 0   • hydrocortisone 1 % Cream Apply 1 Application topically 2 times a day. Apply to underarms during atopic dermatitis reaction 20 g 1   • naproxen (NAPROSYN) 500 MG Tab Take 1 Tablet by mouth 2 times a day with meals. 60 Tablet 0   • omeprazole (PRILOSEC) 20 MG delayed-release capsule Take 1 Capsule by mouth every day. 30 Capsule 0   • lisinopril (PRINIVIL) 40 MG tablet Take 40 mg by mouth every day.     • atorvastatin (LIPITOR) 20 MG Tab Take 20 mg by mouth every evening.     • ibuprofen (MOTRIN) 600 MG Tab Take 600 mg by mouth every 6 hours as needed.       No current facility-administered medications for this visit.       Social History     Tobacco Use   • Smoking  "status: Never Smoker   • Smokeless tobacco: Never Used   Vaping Use   • Vaping Use: Never used   Substance Use Topics   • Alcohol use: Never   • Drug use: Never     Social History     Social History Narrative    ** Merged History Encounter **            History reviewed. No pertinent family history.     ROS   See HPI      - NOTE: All other systems reviewed and are negative, except as in HPI.      Exam:    BP (!) 140/90 (BP Location: Right arm, Patient Position: Sitting, BP Cuff Size: Adult)   Pulse 89   Temp 36.6 °C (97.9 °F) (Temporal)   Resp 20   Ht 1.651 m (5' 5\")   Wt 101 kg (222 lb 12.8 oz)   SpO2 99%  Body mass index is 37.08 kg/m².    General: Alert, pleasant, NAD  EYES:   PERRL, EOMI, no icterus or pallor.  Conjunctivae and lids normal.   HENT:  Normocephalic.  External ears normal.   Respiratory: Normal respiratory effort.    Skin: Warm, dry, no rashes.  Musculoskeletal: Gait is normal.  Moves all extremities well.    Extremities: No clubbing, cyanosis or edema noted.   Psych:  Affect/mood is normal, judgement is good, memory is intact, grooming is appropriate.      LABS:5/20/2022: Results reviewed and discussed with the patient, questions answered.    Assessment/Plan:  Essential hypertension  Blood pressure 140/90.  Continues to take lisinopril 40 mg daily  Denies headaches, lightheadedness, shortness of breath, chest pain    Dry cough  Improved    Gout  Patient noted improvement to his right great toe however over the weekend while he was working and traveling he began to notice right ankle pain and swelling.  He then started taking his colchicine as prescribed and noted within 2 days that the pain had improved.  He does note that he gets occasional pain while he is ambulating but has improved.   His uric acid was 10.4 on labs for 5/20/2022   he denies any trauma  We will start him on colchicine daily even after he has completed this gout flare and have them do a uric acid test again in 1 month   "   For some unknown reason patient was unable to get other labs besides his uric acid completed.  New lab orders printed for patient and he will get these completed on Friday.     Discussed with patient possible alternative diagnoses, patient is to take all medications as prescribed.     If symptoms persist FU w/PCP, if symptoms worsen go to emergency room.     If experiencing any side effects from prescribed medications reports to the office immediately or go to emergency room.    Reviewed indication, dosage, usage and potential adverse effects of prescribed medications.     Reviewed risks and benefits of treatment plan. Patient verbalizes understanding of all instruction and verbally agrees to plan.      Return in about 5 weeks (around 7/6/2022) for follow up on uric acid levels and gout.     Please note that this dictation was created using voice recognition software. I have made every reasonable attempt to correct obvious errors, but I expect that there are errors of grammar and possibly content that I did not discover before finalizing the note.

## 2022-07-26 ENCOUNTER — OFFICE VISIT (OUTPATIENT)
Dept: MEDICAL GROUP | Facility: PHYSICIAN GROUP | Age: 52
End: 2022-07-26
Payer: COMMERCIAL

## 2022-07-26 VITALS
HEART RATE: 103 BPM | DIASTOLIC BLOOD PRESSURE: 88 MMHG | TEMPERATURE: 97.9 F | BODY MASS INDEX: 37.89 KG/M2 | SYSTOLIC BLOOD PRESSURE: 142 MMHG | WEIGHT: 227.4 LBS | HEIGHT: 65 IN | OXYGEN SATURATION: 97 % | RESPIRATION RATE: 16 BRPM

## 2022-07-26 DIAGNOSIS — I10 ESSENTIAL HYPERTENSION: ICD-10-CM

## 2022-07-26 DIAGNOSIS — G89.29 CHRONIC PAIN OF RIGHT ANKLE: ICD-10-CM

## 2022-07-26 DIAGNOSIS — M25.571 CHRONIC PAIN OF RIGHT ANKLE: ICD-10-CM

## 2022-07-26 DIAGNOSIS — E79.0 ELEVATED BLOOD URIC ACID LEVEL: ICD-10-CM

## 2022-07-26 DIAGNOSIS — M10.9 ACUTE GOUT INVOLVING TOE OF RIGHT FOOT, UNSPECIFIED CAUSE: ICD-10-CM

## 2022-07-26 PROCEDURE — 99214 OFFICE O/P EST MOD 30 MIN: CPT | Performed by: NURSE PRACTITIONER

## 2022-07-26 RX ORDER — MELOXICAM 7.5 MG/1
7.5 TABLET ORAL DAILY
Qty: 60 TABLET | Refills: 0 | Status: SHIPPED | OUTPATIENT
Start: 2022-07-26 | End: 2023-09-06 | Stop reason: SDUPTHER

## 2022-07-26 RX ORDER — ALLOPURINOL 100 MG/1
100 TABLET ORAL DAILY
Qty: 45 TABLET | Refills: 0 | Status: SHIPPED | OUTPATIENT
Start: 2022-07-26 | End: 2022-10-19

## 2022-07-27 ENCOUNTER — APPOINTMENT (OUTPATIENT)
Dept: RADIOLOGY | Facility: IMAGING CENTER | Age: 52
End: 2022-07-27
Attending: NURSE PRACTITIONER
Payer: COMMERCIAL

## 2022-07-27 ENCOUNTER — NON-PROVIDER VISIT (OUTPATIENT)
Dept: URGENT CARE | Facility: CLINIC | Age: 52
End: 2022-07-27
Payer: COMMERCIAL

## 2022-07-27 DIAGNOSIS — G89.29 CHRONIC PAIN OF RIGHT ANKLE: ICD-10-CM

## 2022-07-27 DIAGNOSIS — M25.571 CHRONIC PAIN OF RIGHT ANKLE: ICD-10-CM

## 2022-07-27 PROBLEM — E79.0 ELEVATED BLOOD URIC ACID LEVEL: Status: ACTIVE | Noted: 2022-07-27

## 2022-07-27 PROCEDURE — 73610 X-RAY EXAM OF ANKLE: CPT | Mod: TC,RT | Performed by: RADIOLOGY

## 2022-07-28 NOTE — PROGRESS NOTES
CC:  Chief Complaint   Patient presents with   • Ankle Pain     FV       HISTORY OF PRESENT ILLNESS: Patient is a 51 y.o. male established patient presenting follow-up ankle pain, labs.  iPad  was utilized during appointment    Hyperlipidemia  Chronic exacerbated condition.  Labs completed on 6/10/2022 showed cholesterol 273 triglycerides 198 HDL 33   Liver enzymes are stable  Patient currently on atorvastatin 20 mg      Essential hypertension  Chronic exacerbated condition.  Patient currently takes lisinopril 40 mg daily  Blood pressure in office 142/88  Denies headaches, lightheadedness, shortness of breath, chest pain    Elevated blood uric acid level  Chronic exacerbated condition  Labs completed on 5/20/2022 show a level of 10.4  Patient has not followed up and getting a uric acid level  Reprinted order for patient to get and reevaluate        Chronic pain of right ankle  New to me  Chronic exacerbated condition  Patient has been utilizing Aleve for pain  He thought this was due to his gout however feels different and is not in the same location  Patient states that he has difficulty walking at times to the point that he has had to use his wife to help him get to the bathroom or he will hop on 1 leg  Patient denies any trauma to the area, redness, swelling, heat         Allergies:Patient has no known allergies.    Current Outpatient Medications   Medication Sig Dispense Refill   • allopurinol (ZYLOPRIM) 100 MG Tab Take 1 Tablet by mouth every day. 45 Tablet 0   • meloxicam (MOBIC) 7.5 MG Tab Take 1 Tablet by mouth every day. 60 Tablet 0   • colchicine (COLCRYS) 0.6 MG Tab 0.6 mg daily. For gout flare Day 1: First sign of a flare you need to take 1.2 mg followed by 0.6 mg after 1 hour initiate as soon as possible ideally within the first 12 to 24 hours of the flare.  Day 2 and any day afterwards you will take 0.6 mg once 60 Tablet 0   • hydrocortisone 1 % Cream Apply 1 Application topically 2  "times a day. Apply to underarms during atopic dermatitis reaction 20 g 1   • omeprazole (PRILOSEC) 20 MG delayed-release capsule Take 1 Capsule by mouth every day. 30 Capsule 0   • lisinopril (PRINIVIL) 40 MG tablet Take 40 mg by mouth every day.     • atorvastatin (LIPITOR) 20 MG Tab Take 20 mg by mouth every evening.     • ibuprofen (MOTRIN) 600 MG Tab Take 600 mg by mouth every 6 hours as needed.       No current facility-administered medications for this visit.     Past Medical History:   Diagnosis Date   • Hyperlipidemia    • Hypertension      Past Surgical History:   Procedure Laterality Date   • PB EXCISE HAND/FOOT NEUROMA Right 11/23/2021    Procedure: LEFT WRIST DENERVATION;  Surgeon: Toby Catalan M.D.;  Location: [facility] [place];  Service: Orthopedics     Social History     Tobacco Use   • Smoking status: Never Smoker   • Smokeless tobacco: Never Used   Vaping Use   • Vaping Use: Never used   Substance Use Topics   • Alcohol use: Never   • Drug use: Never     Social History     Social History Narrative    ** Merged History Encounter **            History reviewed. No pertinent family history.     ROS   See HPI      - NOTE: All other systems reviewed and are negative, except as in HPI.      Exam:    BP (!) 142/88 (BP Location: Right arm, Patient Position: Sitting, BP Cuff Size: Adult)   Pulse (!) 103   Temp 36.6 °C (97.9 °F) (Temporal)   Resp 16   Ht 1.651 m (5' 5\")   Wt 103 kg (227 lb 6.4 oz)   SpO2 97%  Body mass index is 37.84 kg/m².    General: Alert, pleasant, NAD  EYES:   PERRL, EOMI, no icterus or pallor.  Conjunctivae and lids normal.   HENT:  Normocephalic.  External ears normal.   Heart: Regular rate and rhythm.  S1 and S2 normal.  No murmurs appreciated.  Respiratory: Normal respiratory effort.  Clear to auscultation bilaterally.  Abdomen: Obese, soft, non-tender, no guarding/rebound. Bowel sounds present.  No hepatosplenomegaly.  No hernias.  Skin: Warm, dry, no " rashes.  Musculoskeletal: Gait is normal.  Moves all extremities well.    Extremities: No clubbing, cyanosis or edema noted.  Right lateral ankle with no noted erythema, edema.  Neurological: No tremors, sensation grossly intact, tone/strength normal, gait is normal.  Psych:  Affect/mood is normal, judgement is good, memory is intact, grooming is appropriate.      LABS: 5/20/2020 to 6/10/2022: Results reviewed and discussed with the patient, questions answered.    Assessment/Plan:  1. Acute gout involving toe of right foot, unspecified cause  - allopurinol (ZYLOPRIM) 100 MG Tab; Take 1 Tablet by mouth every day.  Dispense: 45 Tablet; Refill: 0  Reevaluate uric acid for next visit with a follow-up in 1-2 months    2. Elevated blood uric acid level  - allopurinol (ZYLOPRIM) 100 MG Tab; Take 1 Tablet by mouth every day.  Dispense: 45 Tablet; Refill: 0    3. Chronic pain of right ankle  - DX-ANKLE 3+ VIEWS RIGHT; Future  - meloxicam (MOBIC) 7.5 MG Tab; Take 1 Tablet by mouth every day.  Dispense: 60 Tablet; Refill: 0    4. Essential hypertension  At her next visit we will reevaluate blood pressure and have patient follow-up with blood pressure journal patient may need to get started on a secondary medication for blood pressure however we will be mindful of his gout and any type of diuretics       Discussed with patient possible alternative diagnoses, patient is to take all medications as prescribed.     If symptoms persist FU w/PCP, if symptoms worsen go to emergency room.     If experiencing any side effects from prescribed medications reports to the office immediately or go to emergency room.    Reviewed indication, dosage, usage and potential adverse effects of prescribed medications.     Reviewed risks and benefits of treatment plan. Patient verbalizes understanding of all instruction and verbally agrees to plan.    Return in about 3 weeks (around 8/16/2022) for follow up labs .    Please note that this dictation  was created using voice recognition software. I have made every reasonable attempt to correct obvious errors, but I expect that there are errors of grammar and possibly content that I did not discover before finalizing the note.

## 2022-07-28 NOTE — ASSESSMENT & PLAN NOTE
Chronic exacerbated condition  Labs completed on 5/20/2022 show a level of 10.4  Patient has not followed up and getting a uric acid level  Reprinted order for patient to get and reevaluate

## 2022-07-28 NOTE — ASSESSMENT & PLAN NOTE
New to me  Chronic exacerbated condition  Patient has been utilizing Aleve for pain  He thought this was due to his gout however feels different and is not in the same location  Patient states that he has difficulty walking at times to the point that he has had to use his wife to help him get to the bathroom or he will hop on 1 leg  Patient denies any trauma to the area, redness, swelling, heat

## 2022-07-28 NOTE — ASSESSMENT & PLAN NOTE
Chronic exacerbated condition.  Labs completed on 6/10/2022 showed cholesterol 273 triglycerides 198 HDL 33   Liver enzymes are stable  Patient currently on atorvastatin 20 mg

## 2022-07-28 NOTE — ASSESSMENT & PLAN NOTE
Chronic exacerbated condition.  Patient currently takes lisinopril 40 mg daily  Blood pressure in office 142/88  Denies headaches, lightheadedness, shortness of breath, chest pain

## 2022-10-18 DIAGNOSIS — E79.0 ELEVATED BLOOD URIC ACID LEVEL: ICD-10-CM

## 2022-10-18 DIAGNOSIS — M10.9 ACUTE GOUT INVOLVING TOE OF RIGHT FOOT, UNSPECIFIED CAUSE: ICD-10-CM

## 2022-10-19 RX ORDER — ALLOPURINOL 100 MG/1
TABLET ORAL
Qty: 90 TABLET | Refills: 0 | Status: SHIPPED | OUTPATIENT
Start: 2022-10-19 | End: 2023-05-09

## 2022-11-08 ENCOUNTER — NON-PROVIDER VISIT (OUTPATIENT)
Dept: MEDICAL GROUP | Facility: PHYSICIAN GROUP | Age: 52
End: 2022-11-08
Payer: COMMERCIAL

## 2022-11-08 DIAGNOSIS — Z23 NEED FOR VACCINATION: ICD-10-CM

## 2022-11-08 PROCEDURE — 90686 IIV4 VACC NO PRSV 0.5 ML IM: CPT | Performed by: NURSE PRACTITIONER

## 2022-11-08 PROCEDURE — 90471 IMMUNIZATION ADMIN: CPT | Performed by: NURSE PRACTITIONER

## 2022-11-08 NOTE — NON-PROVIDER
"Cristian Suarez is a 52 y.o. male here for a non-provider visit for:   FLU    Reason for immunization: Annual Flu Vaccine  Immunization records indicate need for vaccine: Yes, confirmed with Epic  Minimum interval has been met for this vaccine: Yes  ABN completed: Yes    VIS Dated  8/6/2021 was given to patient: Yes  All IAC Questionnaire questions were answered \"No.\"    Patient tolerated injection and no adverse effects were observed or reported: Yes    Pt scheduled for next dose in series: No   "

## 2023-05-08 DIAGNOSIS — E79.0 ELEVATED BLOOD URIC ACID LEVEL: ICD-10-CM

## 2023-05-08 DIAGNOSIS — M10.9 ACUTE GOUT INVOLVING TOE OF RIGHT FOOT, UNSPECIFIED CAUSE: ICD-10-CM

## 2023-05-09 RX ORDER — ALLOPURINOL 100 MG/1
TABLET ORAL
Qty: 30 TABLET | Refills: 0 | Status: SHIPPED | OUTPATIENT
Start: 2023-05-09 | End: 2023-05-30 | Stop reason: SDUPTHER

## 2023-05-30 ENCOUNTER — OFFICE VISIT (OUTPATIENT)
Dept: MEDICAL GROUP | Facility: PHYSICIAN GROUP | Age: 53
End: 2023-05-30
Payer: COMMERCIAL

## 2023-05-30 VITALS
HEIGHT: 65 IN | SYSTOLIC BLOOD PRESSURE: 160 MMHG | DIASTOLIC BLOOD PRESSURE: 108 MMHG | WEIGHT: 223.6 LBS | HEART RATE: 92 BPM | BODY MASS INDEX: 37.25 KG/M2 | OXYGEN SATURATION: 96 % | TEMPERATURE: 98 F | RESPIRATION RATE: 14 BRPM

## 2023-05-30 DIAGNOSIS — R07.9 CHEST PAIN, UNSPECIFIED TYPE: ICD-10-CM

## 2023-05-30 DIAGNOSIS — G89.29 CHRONIC PAIN OF RIGHT ANKLE: ICD-10-CM

## 2023-05-30 DIAGNOSIS — I10 ESSENTIAL HYPERTENSION: ICD-10-CM

## 2023-05-30 DIAGNOSIS — E79.0 ELEVATED BLOOD URIC ACID LEVEL: ICD-10-CM

## 2023-05-30 DIAGNOSIS — Z00.00 WELL ADULT EXAM: ICD-10-CM

## 2023-05-30 DIAGNOSIS — E78.2 MIXED HYPERLIPIDEMIA: ICD-10-CM

## 2023-05-30 DIAGNOSIS — M25.571 CHRONIC PAIN OF RIGHT ANKLE: ICD-10-CM

## 2023-05-30 DIAGNOSIS — K21.9 GASTROESOPHAGEAL REFLUX DISEASE WITHOUT ESOPHAGITIS: ICD-10-CM

## 2023-05-30 DIAGNOSIS — M10.9 ACUTE GOUT INVOLVING TOE OF RIGHT FOOT, UNSPECIFIED CAUSE: ICD-10-CM

## 2023-05-30 PROBLEM — R05.8 DRY COUGH: Status: RESOLVED | Noted: 2022-05-13 | Resolved: 2023-05-30

## 2023-05-30 PROCEDURE — 99214 OFFICE O/P EST MOD 30 MIN: CPT | Performed by: NURSE PRACTITIONER

## 2023-05-30 PROCEDURE — 3080F DIAST BP >= 90 MM HG: CPT | Performed by: NURSE PRACTITIONER

## 2023-05-30 PROCEDURE — 3077F SYST BP >= 140 MM HG: CPT | Performed by: NURSE PRACTITIONER

## 2023-05-30 RX ORDER — IBUPROFEN 600 MG/1
600 TABLET ORAL EVERY 6 HOURS PRN
Qty: 90 TABLET | Refills: 0 | Status: SHIPPED | OUTPATIENT
Start: 2023-05-30 | End: 2023-09-06 | Stop reason: SDUPTHER

## 2023-05-30 RX ORDER — LISINOPRIL 40 MG/1
10 TABLET ORAL DAILY
COMMUNITY
Start: 2023-05-07 | End: 2023-05-30

## 2023-05-30 RX ORDER — ATORVASTATIN CALCIUM 20 MG/1
20 TABLET, FILM COATED ORAL NIGHTLY
Qty: 90 TABLET | Refills: 3 | Status: SHIPPED | OUTPATIENT
Start: 2023-05-30 | End: 2023-09-06 | Stop reason: SDUPTHER

## 2023-05-30 RX ORDER — LISINOPRIL 40 MG/1
40 TABLET ORAL DAILY
Qty: 90 TABLET | Refills: 3 | Status: SHIPPED | OUTPATIENT
Start: 2023-05-30 | End: 2023-09-06 | Stop reason: SDUPTHER

## 2023-05-30 RX ORDER — ALLOPURINOL 100 MG/1
100 TABLET ORAL DAILY
Qty: 90 TABLET | Refills: 3 | Status: SHIPPED | OUTPATIENT
Start: 2023-05-30 | End: 2023-09-06 | Stop reason: SDUPTHER

## 2023-05-30 RX ORDER — METHYLPREDNISOLONE 4 MG/1
TABLET ORAL
COMMUNITY
Start: 2023-05-07 | End: 2023-05-30

## 2023-05-30 RX ORDER — COLCHICINE 0.6 MG/1
TABLET ORAL
Qty: 60 TABLET | Refills: 0 | Status: SHIPPED | OUTPATIENT
Start: 2023-05-30 | End: 2023-09-06 | Stop reason: SDUPTHER

## 2023-05-30 RX ORDER — OMEPRAZOLE 20 MG/1
20 CAPSULE, DELAYED RELEASE ORAL DAILY
Qty: 90 CAPSULE | Refills: 3 | Status: SHIPPED | OUTPATIENT
Start: 2023-05-30 | End: 2023-09-06 | Stop reason: SDUPTHER

## 2023-05-30 ASSESSMENT — ENCOUNTER SYMPTOMS
SHORTNESS OF BREATH: 0
HEARTBURN: 1
DIZZINESS: 0
CONSTIPATION: 0
DEPRESSION: 1
HEADACHES: 0
VOMITING: 0
ABDOMINAL PAIN: 0
NAUSEA: 0
PALPITATIONS: 0
FEVER: 0
CHILLS: 0
DIARRHEA: 0

## 2023-05-30 ASSESSMENT — PATIENT HEALTH QUESTIONNAIRE - PHQ9
CLINICAL INTERPRETATION OF PHQ2 SCORE: 3
5. POOR APPETITE OR OVEREATING: 0 - NOT AT ALL
SUM OF ALL RESPONSES TO PHQ QUESTIONS 1-9: 13

## 2023-05-30 NOTE — PROGRESS NOTES
"CC:  Chief Complaint   Patient presents with    Medication Refill     All    Depression       HISTORY OF PRESENT ILLNESS: Patient is a 52 y.o. male established patient presenting     Problem   Chest Pain    Patient states that recently he was seen in the hospital after having some left-sided chest pain.  He states that they felt it was possibly related to his blood pressure and told him to follow-up with his PCP.    Patient denies any current active chest pain, shortness of breath, dizziness, nausea, vomiting, diaphoresis, walking       Essential Hypertension    Patient was recently seen in the emergency room after a gout attack and noted to have elevated blood pressure there.  Blood pressure in office today 160/108.  He states that he has run out of his blood pressure medication for the last few months.       Gout    Patient was recently seen in the ER after having severe gout attack.  He states that he ran out of his allopurinol and was unable to get it refilled.  He is requesting refills today as well as new labs       Hyperlipidemia    Patient needs new labs and new refills       Dry Cough (Resolved)         Review of Systems   Constitutional:  Negative for chills and fever.   Respiratory:  Negative for shortness of breath.    Cardiovascular:  Negative for chest pain, palpitations and leg swelling.   Gastrointestinal:  Positive for heartburn. Negative for abdominal pain, constipation, diarrhea, nausea and vomiting.   Neurological:  Negative for dizziness and headaches.   Psychiatric/Behavioral:  Positive for depression.              - NOTE: All other systems reviewed and are negative, except as in HPI.      Exam:    BP (!) 160/108 (BP Location: Left arm, Patient Position: Sitting, BP Cuff Size: Adult)   Pulse 92   Temp 36.7 °C (98 °F) (Temporal)   Resp 14   Ht 1.651 m (5' 5\")   Wt 101 kg (223 lb 9.6 oz)   SpO2 96%  Body mass index is 37.21 kg/m².    Physical Exam  Constitutional:       General: He is not " in acute distress.     Appearance: Normal appearance. He is obese. He is not ill-appearing.   HENT:      Head: Normocephalic and atraumatic.   Cardiovascular:      Rate and Rhythm: Normal rate and regular rhythm.      Pulses: Normal pulses.      Heart sounds: Normal heart sounds.   Pulmonary:      Effort: Pulmonary effort is normal.      Breath sounds: Normal breath sounds.   Musculoskeletal:         General: No swelling. Normal range of motion.   Skin:     General: Skin is warm and dry.      Capillary Refill: Capillary refill takes less than 2 seconds.   Neurological:      Mental Status: He is alert and oriented to person, place, and time.   Psychiatric:         Behavior: Behavior normal.           Assessment/Plan:    1. Well adult exam  - CBC WITHOUT DIFFERENTIAL; Future  - Lipid Profile; Future  - TSH; Future  - FREE THYROXINE; Future  - URIC ACID; Future  - Comp Metabolic Panel; Future    2. Gastroesophageal reflux disease without esophagitis  Chronic and stable condition.  - omeprazole (PRILOSEC) 20 MG delayed-release capsule; Take 1 Capsule by mouth every day.  Dispense: 90 Capsule; Refill: 3    3. Acute gout involving toe of right foot, unspecified cause  Chronic and stable condition  - ibuprofen (MOTRIN) 600 MG Tab; Take 1 Tablet by mouth every 6 hours as needed for Moderate Pain.  Dispense: 90 Tablet; Refill: 0  - colchicine (COLCRYS) 0.6 MG Tab; 0.6 mg daily. For gout flare Day 1: First sign of a flare you need to take 1.2 mg followed by 0.6 mg after 1 hour initiate as soon as possible ideally within the first 12 to 24 hours of the flare.  Day 2 and any day afterwards you will take 0.6 mg once  Dispense: 60 Tablet; Refill: 0  - allopurinol (ZYLOPRIM) 100 MG Tab; Take 1 Tablet by mouth every day.  Dispense: 90 Tablet; Refill: 3    4. Elevated blood uric acid level  Chronic stable condition  - allopurinol (ZYLOPRIM) 100 MG Tab; Take 1 Tablet by mouth every day.  Dispense: 90 Tablet; Refill: 3    5. Essential  hypertension  Chronic exacerbated condition  - lisinopril (PRINIVIL) 40 MG tablet; Take 1 Tablet by mouth every day.  Dispense: 90 Tablet; Refill: 3    6. Chronic pain of right ankle  Chronic and stable condition  - ibuprofen (MOTRIN) 600 MG Tab; Take 1 Tablet by mouth every 6 hours as needed for Moderate Pain.  Dispense: 90 Tablet; Refill: 0    7. Mixed hyperlipidemia  Chronic and stable condition  - atorvastatin (LIPITOR) 20 MG Tab; Take 1 Tablet by mouth every evening.  Dispense: 90 Tablet; Refill: 3    8. Chest pain, unspecified type  Acute uncomplicated condition  Has noted this experience a few times.  He denies any at this time.  We will get stress test to rule out any cardiac involvement  - NM-CARDIAC STRESS TEST; Future       Discussed with patient possible alternative diagnoses, patient is to take all medications as prescribed.     If symptoms persist FU w/PCP, if symptoms worsen go to emergency room.     If experiencing any side effects from prescribed medications reports to the office immediately or go to emergency room.    Reviewed indication, dosage, usage and potential adverse effects of prescribed medications.     Reviewed risks and benefits of treatment plan. Patient verbalizes understanding of all instruction and verbally agrees to plan.      Return in about 4 weeks (around 6/27/2023) for Follow-up in 3 to 4 weeks for blood pressure management.      Please note that this dictation was created using voice recognition software. I have made every reasonable attempt to correct obvious errors, but I expect that there are errors of grammar and possibly content that I did not discover before finalizing the note.

## 2023-06-28 ENCOUNTER — TELEPHONE (OUTPATIENT)
Dept: HEALTH INFORMATION MANAGEMENT | Facility: OTHER | Age: 53
End: 2023-06-28

## 2023-09-06 DIAGNOSIS — E79.0 ELEVATED BLOOD URIC ACID LEVEL: ICD-10-CM

## 2023-09-06 DIAGNOSIS — L20.9 ATOPIC DERMATITIS, UNSPECIFIED TYPE: ICD-10-CM

## 2023-09-06 DIAGNOSIS — M10.9 ACUTE GOUT INVOLVING TOE OF RIGHT FOOT, UNSPECIFIED CAUSE: ICD-10-CM

## 2023-09-06 DIAGNOSIS — M25.571 CHRONIC PAIN OF RIGHT ANKLE: ICD-10-CM

## 2023-09-06 DIAGNOSIS — K21.9 GASTROESOPHAGEAL REFLUX DISEASE WITHOUT ESOPHAGITIS: ICD-10-CM

## 2023-09-06 DIAGNOSIS — I10 ESSENTIAL HYPERTENSION: ICD-10-CM

## 2023-09-06 DIAGNOSIS — G89.29 CHRONIC PAIN OF RIGHT ANKLE: ICD-10-CM

## 2023-09-06 DIAGNOSIS — E78.2 MIXED HYPERLIPIDEMIA: ICD-10-CM

## 2023-09-07 RX ORDER — BENZOCAINE/MENTHOL 6 MG-10 MG
1 LOZENGE MUCOUS MEMBRANE 2 TIMES DAILY
Qty: 20 G | Refills: 1 | Status: SHIPPED | OUTPATIENT
Start: 2023-09-07

## 2023-09-07 RX ORDER — MELOXICAM 7.5 MG/1
7.5 TABLET ORAL DAILY
Qty: 60 TABLET | Refills: 0 | Status: SHIPPED | OUTPATIENT
Start: 2023-09-07

## 2023-09-07 RX ORDER — ATORVASTATIN CALCIUM 20 MG/1
20 TABLET, FILM COATED ORAL NIGHTLY
Qty: 90 TABLET | Refills: 3 | Status: SHIPPED | OUTPATIENT
Start: 2023-09-07

## 2023-09-07 RX ORDER — COLCHICINE 0.6 MG/1
TABLET ORAL
Qty: 60 TABLET | Refills: 0 | Status: SHIPPED | OUTPATIENT
Start: 2023-09-07

## 2023-09-07 RX ORDER — OMEPRAZOLE 20 MG/1
20 CAPSULE, DELAYED RELEASE ORAL DAILY
Qty: 90 CAPSULE | Refills: 3 | Status: SHIPPED | OUTPATIENT
Start: 2023-09-07

## 2023-09-07 RX ORDER — IBUPROFEN 600 MG/1
600 TABLET ORAL EVERY 6 HOURS PRN
Qty: 90 TABLET | Refills: 0 | Status: SHIPPED | OUTPATIENT
Start: 2023-09-07 | End: 2024-03-05

## 2023-09-07 RX ORDER — ALLOPURINOL 100 MG/1
100 TABLET ORAL DAILY
Qty: 90 TABLET | Refills: 3 | Status: SHIPPED | OUTPATIENT
Start: 2023-09-07

## 2023-09-07 RX ORDER — LISINOPRIL 40 MG/1
40 TABLET ORAL DAILY
Qty: 90 TABLET | Refills: 3 | Status: SHIPPED | OUTPATIENT
Start: 2023-09-07

## 2024-01-17 ENCOUNTER — OFFICE VISIT (OUTPATIENT)
Dept: MEDICAL GROUP | Facility: PHYSICIAN GROUP | Age: 54
End: 2024-01-17
Payer: COMMERCIAL

## 2024-01-17 VITALS
HEIGHT: 65 IN | WEIGHT: 237 LBS | DIASTOLIC BLOOD PRESSURE: 90 MMHG | BODY MASS INDEX: 39.49 KG/M2 | SYSTOLIC BLOOD PRESSURE: 156 MMHG | RESPIRATION RATE: 16 BRPM | HEART RATE: 92 BPM | OXYGEN SATURATION: 95 % | TEMPERATURE: 98.9 F

## 2024-01-17 DIAGNOSIS — R07.9 CHEST PAIN, UNSPECIFIED TYPE: ICD-10-CM

## 2024-01-17 DIAGNOSIS — F41.9 ANXIETY: ICD-10-CM

## 2024-01-17 DIAGNOSIS — F33.1 MODERATE EPISODE OF RECURRENT MAJOR DEPRESSIVE DISORDER (HCC): ICD-10-CM

## 2024-01-17 DIAGNOSIS — I10 ESSENTIAL HYPERTENSION: ICD-10-CM

## 2024-01-17 PROBLEM — E66.9 OBESITY, CLASS II, BMI 35-39.9: Status: ACTIVE | Noted: 2022-03-04

## 2024-01-17 PROBLEM — E66.812 OBESITY, CLASS II, BMI 35-39.9: Status: ACTIVE | Noted: 2022-03-04

## 2024-01-17 PROCEDURE — 3077F SYST BP >= 140 MM HG: CPT | Performed by: NURSE PRACTITIONER

## 2024-01-17 PROCEDURE — 3080F DIAST BP >= 90 MM HG: CPT | Performed by: NURSE PRACTITIONER

## 2024-01-17 PROCEDURE — 99215 OFFICE O/P EST HI 40 MIN: CPT | Performed by: NURSE PRACTITIONER

## 2024-01-17 PROCEDURE — 93000 ELECTROCARDIOGRAM COMPLETE: CPT | Performed by: NURSE PRACTITIONER

## 2024-01-17 ASSESSMENT — PATIENT HEALTH QUESTIONNAIRE - PHQ9
CLINICAL INTERPRETATION OF PHQ2 SCORE: 4
SUM OF ALL RESPONSES TO PHQ QUESTIONS 1-9: 15
5. POOR APPETITE OR OVEREATING: 3 - NEARLY EVERY DAY

## 2024-01-17 ASSESSMENT — ENCOUNTER SYMPTOMS
FEVER: 0
NERVOUS/ANXIOUS: 1
WEIGHT LOSS: 0
SHORTNESS OF BREATH: 0
CHILLS: 0
DEPRESSION: 1
PALPITATIONS: 0
DIZZINESS: 0
HEADACHES: 0

## 2024-01-18 NOTE — PROGRESS NOTES
CC:  Chief Complaint   Patient presents with    Annual Exam     PE        HISTORY OF PRESENT ILLNESS: Patient is a 53 y.o. male established patient presenting     Problem   Moderate Episode of Recurrent Major Depressive Disorder (Hcc)    Patient states he is not interested in anything, does not care about anything  Feels tired, no energy, does not want to go to work   Has been feeling like this for a while but thought it was related to his job loss and pain  Has been on medication for this in the past but does no remember the name   Wife states she has been noticing he has to have something to do and has been on his phone a lot  Was gambling a lot and losing a lot of money     Denies SI/HI    Depression Screening    Little interest or pleasure in doing things?  2 - more than half the days   Feeling down, depressed , or hopeless? 2 - more than half the days   Trouble falling or staying asleep, or sleeping too much?  2 - more than half the days   Feeling tired or having little energy?  3 - nearly every day   Poor appetite or overeating?  3 - nearly every day   Feeling bad about yourself - or that you are a failure or have let yourself or your family down? 3 - nearly every day   Trouble concentrating on things, such as reading the newspaper or watching television? 0 - not at all   Moving or speaking so slowly that other people could have noticed.  Or the opposite - being so fidgety or restless that you have been moving around a lot more than usual?  0 - not at all   Thoughts that you would be better off dead, or of hurting yourself?  0 - not at all   Patient Health Questionnaire Score: 15       If depressive symptoms identified deferred to follow up visit unless specifically addressed in assesment and plan.    Interpretation of PHQ-9 Total Score   Score Severity   1-4 No Depression   5-9 Mild Depression   10-14 Moderate Depression   15-19 Moderately Severe Depression   20-27 Severe Depression       Anxiety    Anxiety  "about money   Was gambling and wife took his cards and money away        Chest Pain    Patient states that he has been experiencing chest pain recently.    We will get EKG completed here in the office.  Patient never completed stress test that was ordered back in May.    Does have elevated blood pressure today 156/90 however patient does state he is anxious as well as depressed and is not following up on his medications as prescribed.       Essential Hypertension    BP in office 156/90  Notes occasional CP   Currently takes lisinopril 40 mg but has only be taking it once a week     Obesity, Class II, Bmi 35-39.9         Review of Systems   Constitutional:  Negative for chills, fever, malaise/fatigue and weight loss.   Respiratory:  Negative for shortness of breath.    Cardiovascular:  Positive for chest pain. Negative for palpitations and leg swelling.   Neurological:  Negative for dizziness and headaches.   Psychiatric/Behavioral:  Positive for depression. Negative for suicidal ideas. The patient is nervous/anxious.              - NOTE: All other systems reviewed and are negative, except as in HPI.      Exam:    BP (!) 156/90   Pulse 92   Temp 37.2 °C (98.9 °F) (Temporal)   Resp 16   Ht 1.651 m (5' 5\")   Wt 108 kg (237 lb)   SpO2 95%  Body mass index is 39.44 kg/m².    Physical Exam  Constitutional:       Appearance: Normal appearance. He is obese.   HENT:      Head: Normocephalic and atraumatic.   Cardiovascular:      Rate and Rhythm: Normal rate and regular rhythm.      Pulses: Normal pulses.      Heart sounds: Normal heart sounds. No murmur heard.     No gallop.   Pulmonary:      Effort: Pulmonary effort is normal.      Breath sounds: Normal breath sounds.   Neurological:      Mental Status: He is alert and oriented to person, place, and time.   Psychiatric:         Mood and Affect: Mood normal.         Behavior: Behavior normal.         Thought Content: Thought content normal.         Judgment: Judgment " normal.           Assessment/Plan:    1. Moderate episode of recurrent major depressive disorder (HCC)  Chronic stable condition  We will follow-up at next visit  - sertraline (ZOLOFT) 50 MG Tab; Take 1 Tablet by mouth every day.  Dispense: 30 Tablet; Refill: 11    2. Essential hypertension  Chronic exacerbated condition  - EKG - Clinic Performed  EKG Interpretation-HR is 86 normal sinus rhythm, with abnormal T waves in the lateral leads aVL, V5 and V6.  2 EKGs were completed which showed similar results.    Discussed EKG with patient and wife and they plan to drive to the emergency room immediately.  Offered ambulance however they were not interested at this time.  Discussed concerns for risk of going to the emergency room via private vehicle and they understand.    3. Anxiety  Chronic and exacerbated condition  - sertraline (ZOLOFT) 50 MG Tab; Take 1 Tablet by mouth every day.  Dispense: 30 Tablet; Refill: 11    4. Chest pain, unspecified type  Undiagnosed new condition uncertain prognosis  See #2  - EKG - Clinic Performed       Phone call placed to [facility] to inform them of patient coming private vehicle.  Report given to charge nurse at [phone].      Discussed with patient possible alternative diagnoses, patient is to take all medications as prescribed.     If symptoms persist FU w/PCP, if symptoms worsen go to emergency room.     If experiencing any side effects from prescribed medications reports to the office immediately or go to emergency room.    Reviewed indication, dosage, usage and potential adverse effects of prescribed medications.     Reviewed risks and benefits of treatment plan. Patient verbalizes understanding of all instruction and verbally agrees to plan.      Return for Follow-up after hospital visit.      Please note that this dictation was created using voice recognition software. I have made every reasonable attempt to correct obvious errors, but I expect that there are errors of grammar  and possibly content that I did not discover before finalizing the note.

## 2024-02-21 ENCOUNTER — TELEPHONE (OUTPATIENT)
Dept: MEDICAL GROUP | Facility: PHYSICIAN GROUP | Age: 54
End: 2024-02-21
Payer: COMMERCIAL

## 2024-03-04 DIAGNOSIS — G89.29 CHRONIC PAIN OF RIGHT ANKLE: ICD-10-CM

## 2024-03-04 DIAGNOSIS — M25.571 CHRONIC PAIN OF RIGHT ANKLE: ICD-10-CM

## 2024-03-04 DIAGNOSIS — M10.9 ACUTE GOUT INVOLVING TOE OF RIGHT FOOT, UNSPECIFIED CAUSE: ICD-10-CM

## 2024-03-05 RX ORDER — IBUPROFEN 600 MG/1
600 TABLET ORAL EVERY 6 HOURS PRN
Qty: 90 TABLET | Refills: 0 | Status: SHIPPED | OUTPATIENT
Start: 2024-03-05

## 2024-03-06 NOTE — TELEPHONE ENCOUNTER
Received request via: Pharmacy    Was the patient seen in the last year in this department? Yes    Does the patient have an active prescription (recently filled or refills available) for medication(s) requested? No    Pharmacy Name: Pharmacy:   [facility] [id number] - [place], [place] - [address]      Does the patient have skilled nursing Plus and need 100 day supply (blood pressure, diabetes and cholesterol meds only)? Patient does not have SCP

## 2024-06-27 DIAGNOSIS — M10.9 ACUTE GOUT INVOLVING TOE OF RIGHT FOOT, UNSPECIFIED CAUSE: ICD-10-CM

## 2024-06-27 DIAGNOSIS — M25.571 CHRONIC PAIN OF RIGHT ANKLE: ICD-10-CM

## 2024-06-27 DIAGNOSIS — G89.29 CHRONIC PAIN OF RIGHT ANKLE: ICD-10-CM

## 2024-06-27 RX ORDER — IBUPROFEN 600 MG/1
600 TABLET ORAL EVERY 6 HOURS PRN
Qty: 90 TABLET | Refills: 0 | Status: SHIPPED | OUTPATIENT
Start: 2024-06-27

## 2024-06-27 NOTE — TELEPHONE ENCOUNTER
Received request via: Pharmacy    Was the patient seen in the last year in this department? Yes    Does the patient have an active prescription (recently filled or refills available) for medication(s) requested? No    [facility] Name[facility]     Does the patient have group home Plus and need 100 day supply (blood pressure, diabetes and cholesterol meds only)? Patient does not have SCP

## 2024-09-23 DIAGNOSIS — I10 ESSENTIAL HYPERTENSION: ICD-10-CM

## 2024-09-23 DIAGNOSIS — E78.2 MIXED HYPERLIPIDEMIA: ICD-10-CM

## 2024-09-23 RX ORDER — ATORVASTATIN CALCIUM 20 MG/1
20 TABLET, FILM COATED ORAL EVERY EVENING
Qty: 90 TABLET | Refills: 3 | Status: SHIPPED | OUTPATIENT
Start: 2024-09-23

## 2024-09-23 NOTE — TELEPHONE ENCOUNTER
Received request via: Pharmacy    Was the patient seen in the last year in this department? Yes    Does the patient have an active prescription (recently filled or refills available) for medication(s) requested? No    Pharmacy Name: [facility]     Does the patient have jail Plus and need 100-day supply? (This applies to ALL medications) Patient does not have SCP

## 2024-09-24 NOTE — TELEPHONE ENCOUNTER
Received request via: Patient    Was the patient seen in the last year in this department? Yes    Does the patient have an active prescription (recently filled or refills available) for medication(s) requested? No    Pharmacy Name: [facility]    Does the patient have alf Plus and need 100-day supply? (This applies to ALL medications) Patient does not have SCP

## 2024-09-27 RX ORDER — LISINOPRIL 40 MG/1
40 TABLET ORAL DAILY
Qty: 90 TABLET | Refills: 3 | Status: SHIPPED | OUTPATIENT
Start: 2024-09-27

## 2024-12-03 ENCOUNTER — OFFICE VISIT (OUTPATIENT)
Dept: MEDICAL GROUP | Facility: PHYSICIAN GROUP | Age: 54
End: 2024-12-03
Payer: COMMERCIAL

## 2024-12-03 VITALS
RESPIRATION RATE: 16 BRPM | OXYGEN SATURATION: 97 % | HEART RATE: 97 BPM | BODY MASS INDEX: 39.99 KG/M2 | WEIGHT: 240 LBS | TEMPERATURE: 98.6 F | DIASTOLIC BLOOD PRESSURE: 74 MMHG | SYSTOLIC BLOOD PRESSURE: 110 MMHG | HEIGHT: 65 IN

## 2024-12-03 DIAGNOSIS — J01.10 ACUTE NON-RECURRENT FRONTAL SINUSITIS: ICD-10-CM

## 2024-12-03 DIAGNOSIS — Z00.00 WELL ADULT EXAM: ICD-10-CM

## 2024-12-03 PROCEDURE — 99214 OFFICE O/P EST MOD 30 MIN: CPT | Performed by: NURSE PRACTITIONER

## 2024-12-03 PROCEDURE — 3078F DIAST BP <80 MM HG: CPT | Performed by: NURSE PRACTITIONER

## 2024-12-03 PROCEDURE — 3074F SYST BP LT 130 MM HG: CPT | Performed by: NURSE PRACTITIONER

## 2024-12-03 RX ORDER — BENZONATATE 100 MG/1
100 CAPSULE ORAL 3 TIMES DAILY PRN
Qty: 60 CAPSULE | Refills: 0 | Status: SHIPPED | OUTPATIENT
Start: 2024-12-03

## 2024-12-03 ASSESSMENT — ENCOUNTER SYMPTOMS
SHORTNESS OF BREATH: 0
SORE THROAT: 1
VOMITING: 0
CHILLS: 0
MYALGIAS: 1
SINUS PAIN: 1
EYES NEGATIVE: 1
SPUTUM PRODUCTION: 1
HEADACHES: 0
CONSTIPATION: 0
PALPITATIONS: 0
ABDOMINAL PAIN: 0
FEVER: 1
COUGH: 1
DIARRHEA: 0
DIZZINESS: 0
NAUSEA: 0

## 2024-12-04 NOTE — PROGRESS NOTES
"Verbal consent was acquired by the patient to use [employer] ambient listening note generation during this visit     CC:  Chief Complaint   Patient presents with    Sinus Problem     X3wks        Cristian Michael Suarez 54 y.o. male  patient presenting for     History of Present Illness  The patient is a 54-year-old male who presents for evaluation of sinus pain. He is accompanied by his wife.    He has been experiencing sinus pain for the past 3 weeks, accompanied by a sore throat, nausea, and vomiting. He denies any constipation. His symptoms began with a fever and runny nose 2 weeks ago. He also reports pain in his left ear and a cough that produces brown phlegm. He has been taking Motrin for pain relief. His cough is more pronounced during the day than at night.      Review of Systems   Constitutional:  Positive for fever. Negative for chills.   HENT:  Positive for congestion, ear pain, sinus pain and sore throat. Negative for ear discharge.    Eyes: Negative.    Respiratory:  Positive for cough and sputum production. Negative for shortness of breath.    Cardiovascular:  Negative for chest pain and palpitations.   Gastrointestinal:  Negative for abdominal pain, constipation, diarrhea, nausea and vomiting.   Genitourinary: Negative.    Musculoskeletal:  Positive for myalgias.   Neurological:  Negative for dizziness and headaches.       /74   Pulse 97   Temp 37 °C (98.6 °F) (Temporal)   Resp 16   Ht 1.651 m (5' 5\")   Wt 109 kg (240 lb)   SpO2 97% , Body mass index is 39.94 kg/m².    Physical Exam  Constitutional:       General: He is not in acute distress.     Appearance: Normal appearance. He is obese. He is not ill-appearing.   HENT:      Head: Normocephalic and atraumatic.      Right Ear: Tympanic membrane, ear canal and external ear normal.      Left Ear: Tympanic membrane, ear canal and external ear normal.      Nose: Congestion present.      Mouth/Throat:      Mouth: Mucous membranes are moist. "      Pharynx: Oropharynx is clear. No oropharyngeal exudate or posterior oropharyngeal erythema.   Eyes:      Extraocular Movements: Extraocular movements intact.      Conjunctiva/sclera: Conjunctivae normal.      Pupils: Pupils are equal, round, and reactive to light.   Cardiovascular:      Rate and Rhythm: Normal rate and regular rhythm.      Pulses: Normal pulses.      Heart sounds: Normal heart sounds.   Pulmonary:      Effort: Pulmonary effort is normal.      Breath sounds: Normal breath sounds.   Musculoskeletal:         General: Normal range of motion.      Cervical back: Normal range of motion and neck supple. No tenderness.   Lymphadenopathy:      Cervical: No cervical adenopathy.   Skin:     General: Skin is warm and dry.   Neurological:      Mental Status: He is alert and oriented to person, place, and time.   Psychiatric:         Mood and Affect: Mood normal.         Behavior: Behavior normal.         Thought Content: Thought content normal.         Judgment: Judgment normal.           Results      Assessment and Plan    Assessment & Plan  1. Sinus infection.  He has been experiencing sinus pain, sore throat, nausea, vomiting, diarrhea, fever, ear pain, body aches, headache, and brown nasal discharge for the past 3 weeks. An antibiotic was prescribed to combat the infection and sent to [facility] pharmacy. He was advised to rest, hydrate adequately, and consider over-the-counter vitamin C or zinc supplements. Nasal irrigation with neti pots was suggested, along with the use of a humidifier. Vicks vapor rub was recommended for symptom relief. For nasal congestion, Ocean spray, Flonase, Nasacort, Claritin, Zyrtec, or Allegra were suggested. Coricidin was recommended for his blood pressure. For pain management, Tylenol, ibuprofen, Advil, or Motrin were suggested. Tessalon Perles was prescribed to be taken three times a day to alleviate the cough. Cepacol lozenges and salt water gargle were recommended for the  sore throat.       1. Acute non-recurrent frontal sinusitis  Acute and uncomplicated condition   - amoxicillin-clavulanate (AUGMENTIN) 875-125 MG Tab; Take 1 Tablet by mouth 2 times a day.  Dispense: 14 Tablet; Refill: 0  - benzonatate (TESSALON) 100 MG Cap; Take 1 Capsule by mouth 3 times a day as needed for Cough.  Dispense: 60 Capsule; Refill: 0    2. Well adult exam  - CBC WITHOUT DIFFERENTIAL; Future  - FREE THYROXINE; Future  - TSH; Future  - Lipid Profile; Future  - Comp Metabolic Panel; Future  - HEMOGLOBIN A1C; Future       Discussed with patient possible alternative diagnoses, patient is to take all medications as prescribed.     If symptoms persist FU w/PCP, if symptoms worsen go to emergency room.     If experiencing any side effects from prescribed medications reports to the office immediately or go to emergency room.    Reviewed indication, dosage, usage and potential adverse effects of prescribed medications.     Reviewed risks and benefits of treatment plan. Patient verbalizes understanding of all instruction and verbally agrees to plan.    Return for pending labs.    This note was created using voice recognition software ([employer]). The accuracy of the dictation is limited by the abilities of the software. I have reviewed the note prior to signing, however some errors in grammar and context are still possible. If you have any questions related to this note please do not hesitate to contact our office.

## 2024-12-04 NOTE — PATIENT INSTRUCTIONS
Discussed supportive care in detail:   Rest  Increase fluid intake, add lemon and honey   Take OTC vit C and Zinc.   Incorporate Nasal irrigation or a Neti-pot (if sinuses involved).   Use humidifier in room or hot shower/bath.   Vicks Vapor rub steam zacarias    Meds:  May use any combination of the following over-the-counter medications per 's instructions:    Nasal congestion/runny nose  - nasal saline sprays (ocean spray)  - nasal steroid sprays (e.g. Flonase, Nasacort)  - oral daily antihistamine (e.g. Claritin, Zyrtec, Allegra)  - oral decongestant (e.g. Sudafed)    Pain reliever, fever reducer  - oral pain reliever (e.g. Tylenol)  - oral anti-inflammatory/pain reliever (NSAID) (e.g. Motrin, Advil)- as directed on back of box    Cough  - as an expectorant/mucus reliever (Mucinex, Robitussin, etc).   - cough suppressant - ( Robitussin, Delsym)  -as well as OTC lozenges for cough (Ricola, Halls etc).    Sore throat  Cepacol lozenges  Throat gargles with saline solution:  To mix your own solution:   --Add distilled water to the bottle's fill line at 240 ml = 8 fl oz  --Add 1/2 teaspoon of salt  --Warm in a microwave by 5 second increments.  --Test on your wrist for lukewarm temperature.

## 2025-01-02 DIAGNOSIS — F41.9 ANXIETY: ICD-10-CM

## 2025-01-02 DIAGNOSIS — F33.1 MODERATE EPISODE OF RECURRENT MAJOR DEPRESSIVE DISORDER (HCC): ICD-10-CM

## 2025-01-02 NOTE — TELEPHONE ENCOUNTER
Received request via: Pharmacy    Was the patient seen in the last year in this department? Yes    Does the patient have an active prescription (recently filled or refills available) for medication(s) requested? No    [facility]    Does the patient have [facility] and need 100-day supply? (This applies to ALL medications) Patient does not have SCP